# Patient Record
Sex: MALE | Race: BLACK OR AFRICAN AMERICAN | NOT HISPANIC OR LATINO | Employment: FULL TIME | URBAN - METROPOLITAN AREA
[De-identification: names, ages, dates, MRNs, and addresses within clinical notes are randomized per-mention and may not be internally consistent; named-entity substitution may affect disease eponyms.]

---

## 2018-08-27 ENCOUNTER — OFFICE VISIT (OUTPATIENT)
Dept: FAMILY MEDICINE | Facility: CLINIC | Age: 28
End: 2018-08-27
Payer: COMMERCIAL

## 2018-08-27 VITALS
WEIGHT: 171.88 LBS | HEIGHT: 65 IN | DIASTOLIC BLOOD PRESSURE: 82 MMHG | HEART RATE: 91 BPM | OXYGEN SATURATION: 98 % | BODY MASS INDEX: 28.64 KG/M2 | SYSTOLIC BLOOD PRESSURE: 130 MMHG

## 2018-08-27 DIAGNOSIS — Z11.4 ENCOUNTER FOR HIV (HUMAN IMMUNODEFICIENCY VIRUS) TEST: ICD-10-CM

## 2018-08-27 DIAGNOSIS — Z00.00 ANNUAL PHYSICAL EXAM: Primary | ICD-10-CM

## 2018-08-27 DIAGNOSIS — Z23 NEED FOR PNEUMOCOCCAL VACCINATION: ICD-10-CM

## 2018-08-27 DIAGNOSIS — Z23 NEED FOR TDAP VACCINATION: ICD-10-CM

## 2018-08-27 PROCEDURE — 90732 PPSV23 VACC 2 YRS+ SUBQ/IM: CPT | Mod: S$GLB,,, | Performed by: NURSE PRACTITIONER

## 2018-08-27 PROCEDURE — 90715 TDAP VACCINE 7 YRS/> IM: CPT | Mod: S$GLB,,, | Performed by: NURSE PRACTITIONER

## 2018-08-27 PROCEDURE — 99385 PREV VISIT NEW AGE 18-39: CPT | Mod: 25,S$GLB,, | Performed by: NURSE PRACTITIONER

## 2018-08-27 PROCEDURE — 90471 IMMUNIZATION ADMIN: CPT | Mod: S$GLB,,, | Performed by: NURSE PRACTITIONER

## 2018-08-27 PROCEDURE — 90472 IMMUNIZATION ADMIN EACH ADD: CPT | Mod: S$GLB,,, | Performed by: NURSE PRACTITIONER

## 2018-08-27 NOTE — PROGRESS NOTES
Subjective:       Patient ID: Cameron Gaona is a 27 y.o. male.    Chief Complaint: Annual Exam    Pt presents to the clinic to day for annual exam. Needs paper work filled out for his job. No complaints.  Would like to get tested got HIV.      Review of Systems   Constitutional: Negative.    HENT: Negative.    Eyes: Negative.    Respiratory: Negative.    Cardiovascular: Negative.    Gastrointestinal: Negative.    Genitourinary: Negative.    Musculoskeletal: Negative.    Neurological: Negative.    Psychiatric/Behavioral: Negative.        Objective:      Physical Exam   Constitutional: He is oriented to person, place, and time. He appears well-developed and well-nourished. No distress.   HENT:   Head: Normocephalic.   Right Ear: Tympanic membrane and external ear normal.   Left Ear: Tympanic membrane and external ear normal.   Nose: No mucosal edema or rhinorrhea. Right sinus exhibits no maxillary sinus tenderness and no frontal sinus tenderness. Left sinus exhibits no maxillary sinus tenderness and no frontal sinus tenderness.   Mouth/Throat: No oropharyngeal exudate, posterior oropharyngeal edema or posterior oropharyngeal erythema.   Eyes: Pupils are equal, round, and reactive to light.   Neck: Normal range of motion.   Cardiovascular: Normal rate, regular rhythm and normal heart sounds.   Pulmonary/Chest: Effort normal and breath sounds normal. No respiratory distress.   Abdominal: Soft. Bowel sounds are normal. He exhibits no distension. There is no tenderness.   Musculoskeletal: Normal range of motion. He exhibits no edema.   Neurological: He is alert and oriented to person, place, and time.   Skin: Skin is warm and dry. He is not diaphoretic.   Psychiatric: He has a normal mood and affect. His behavior is normal. Judgment and thought content normal.       Assessment:       1. Annual physical exam    2. Encounter for HIV (human immunodeficiency virus) test    3. Need for Tdap vaccination    4. Need for  pneumococcal vaccination        Plan:       Annual physical exam  -     CBC auto differential; Future  -     Comprehensive metabolic panel; Future  -     Lipid panel; Future  -     TSH; Future  -     URINALYSIS; Future    Encounter for HIV (human immunodeficiency virus) test  -     HIV-1 and HIV-2 antibodies; Future    Need for Tdap vaccination  -     (In Office Administered) Tdap Vaccine    Need for pneumococcal vaccination  -     (In Office Administered) Pneumococcal Polysaccharide Vaccine (23 Valent) (SQ/IM)

## 2018-08-28 ENCOUNTER — LAB VISIT (OUTPATIENT)
Dept: LAB | Facility: HOSPITAL | Age: 28
End: 2018-08-28
Attending: NURSE PRACTITIONER
Payer: COMMERCIAL

## 2018-08-28 ENCOUNTER — PATIENT MESSAGE (OUTPATIENT)
Dept: FAMILY MEDICINE | Facility: CLINIC | Age: 28
End: 2018-08-28

## 2018-08-28 ENCOUNTER — TELEPHONE (OUTPATIENT)
Dept: FAMILY MEDICINE | Facility: CLINIC | Age: 28
End: 2018-08-28

## 2018-08-28 DIAGNOSIS — Z11.4 ENCOUNTER FOR HIV (HUMAN IMMUNODEFICIENCY VIRUS) TEST: ICD-10-CM

## 2018-08-28 DIAGNOSIS — Z00.00 ANNUAL PHYSICAL EXAM: ICD-10-CM

## 2018-08-28 LAB
ALBUMIN SERPL BCP-MCNC: 4 G/DL
ALP SERPL-CCNC: 81 U/L
ALT SERPL W/O P-5'-P-CCNC: 62 U/L
ANION GAP SERPL CALC-SCNC: 4 MMOL/L
AST SERPL-CCNC: 43 U/L
BASOPHILS # BLD AUTO: 0.01 K/UL
BASOPHILS NFR BLD: 0.1 %
BILIRUB SERPL-MCNC: 0.5 MG/DL
BUN SERPL-MCNC: 13 MG/DL
CALCIUM SERPL-MCNC: 9 MG/DL
CHLORIDE SERPL-SCNC: 106 MMOL/L
CHOLEST SERPL-MCNC: 172 MG/DL
CHOLEST/HDLC SERPL: 3.2 {RATIO}
CO2 SERPL-SCNC: 28 MMOL/L
CREAT SERPL-MCNC: 1.04 MG/DL
DIFFERENTIAL METHOD: NORMAL
EOSINOPHIL # BLD AUTO: 0.1 K/UL
EOSINOPHIL NFR BLD: 1.7 %
ERYTHROCYTE [DISTWIDTH] IN BLOOD BY AUTOMATED COUNT: 14.5 %
EST. GFR  (AFRICAN AMERICAN): >60 ML/MIN/1.73 M^2
EST. GFR  (NON AFRICAN AMERICAN): >60 ML/MIN/1.73 M^2
GLUCOSE SERPL-MCNC: 85 MG/DL
HCT VFR BLD AUTO: 44.1 %
HDLC SERPL-MCNC: 54 MG/DL
HDLC SERPL: 31.4 %
HGB BLD-MCNC: 14.9 G/DL
LDLC SERPL CALC-MCNC: 106 MG/DL
LYMPHOCYTES # BLD AUTO: 1.9 K/UL
LYMPHOCYTES NFR BLD: 23.3 %
MCH RBC QN AUTO: 29 PG
MCHC RBC AUTO-ENTMCNC: 33.8 G/DL
MCV RBC AUTO: 86 FL
MONOCYTES # BLD AUTO: 0.9 K/UL
MONOCYTES NFR BLD: 11 %
NEUTROPHILS # BLD AUTO: 5.2 K/UL
NEUTROPHILS NFR BLD: 63.7 %
NONHDLC SERPL-MCNC: 118 MG/DL
PLATELET # BLD AUTO: 268 K/UL
PMV BLD AUTO: 10.1 FL
POTASSIUM SERPL-SCNC: 4.8 MMOL/L
PROT SERPL-MCNC: 7.1 G/DL
RBC # BLD AUTO: 5.13 M/UL
SODIUM SERPL-SCNC: 138 MMOL/L
TRIGL SERPL-MCNC: 60 MG/DL
TSH SERPL DL<=0.005 MIU/L-ACNC: 1.58 UIU/ML
WBC # BLD AUTO: 8.16 K/UL

## 2018-08-28 PROCEDURE — 36415 COLL VENOUS BLD VENIPUNCTURE: CPT | Mod: PO

## 2018-08-28 PROCEDURE — 80061 LIPID PANEL: CPT

## 2018-08-28 PROCEDURE — 86703 HIV-1/HIV-2 1 RESULT ANTBDY: CPT | Mod: PO

## 2018-08-28 PROCEDURE — 84443 ASSAY THYROID STIM HORMONE: CPT | Mod: PO

## 2018-08-28 PROCEDURE — 80053 COMPREHEN METABOLIC PANEL: CPT | Mod: PO

## 2018-08-28 PROCEDURE — 85025 COMPLETE CBC W/AUTO DIFF WBC: CPT | Mod: PO

## 2018-08-28 NOTE — TELEPHONE ENCOUNTER
Spoke with patient let him know blood work was normal will fax over his paper work to his employer

## 2018-08-29 ENCOUNTER — TELEPHONE (OUTPATIENT)
Dept: FAMILY MEDICINE | Facility: CLINIC | Age: 28
End: 2018-08-29

## 2018-08-29 LAB — HIV 1+2 AB+HIV1 P24 AG SERPL QL IA: NEGATIVE

## 2019-05-11 ENCOUNTER — OFFICE VISIT (OUTPATIENT)
Dept: FAMILY MEDICINE | Facility: CLINIC | Age: 29
End: 2019-05-11
Payer: COMMERCIAL

## 2019-05-11 VITALS
BODY MASS INDEX: 31.94 KG/M2 | WEIGHT: 191.69 LBS | HEIGHT: 65 IN | SYSTOLIC BLOOD PRESSURE: 120 MMHG | OXYGEN SATURATION: 100 % | HEART RATE: 93 BPM | TEMPERATURE: 99 F | DIASTOLIC BLOOD PRESSURE: 80 MMHG

## 2019-05-11 DIAGNOSIS — J06.9 ACUTE URI: ICD-10-CM

## 2019-05-11 DIAGNOSIS — Z20.2 EXPOSURE TO STD: Primary | ICD-10-CM

## 2019-05-11 PROCEDURE — 99213 OFFICE O/P EST LOW 20 MIN: CPT | Mod: S$GLB,,, | Performed by: FAMILY MEDICINE

## 2019-05-11 PROCEDURE — 99213 PR OFFICE/OUTPT VISIT, EST, LEVL III, 20-29 MIN: ICD-10-PCS | Mod: S$GLB,,, | Performed by: FAMILY MEDICINE

## 2019-05-11 RX ORDER — CEFTRIAXONE 1 G/1
1 INJECTION, POWDER, FOR SOLUTION INTRAMUSCULAR; INTRAVENOUS
Status: DISCONTINUED | OUTPATIENT
Start: 2019-05-11 | End: 2019-05-11

## 2019-05-11 RX ORDER — CODEINE PHOSPHATE AND GUAIFENESIN 10; 100 MG/5ML; MG/5ML
5 SOLUTION ORAL 3 TIMES DAILY PRN
Qty: 118 ML | Refills: 0 | Status: SHIPPED | OUTPATIENT
Start: 2019-05-11 | End: 2019-05-21

## 2019-05-11 RX ORDER — METRONIDAZOLE 500 MG/1
TABLET ORAL
Qty: 4 TABLET | Refills: 0 | Status: SHIPPED | OUTPATIENT
Start: 2019-05-11 | End: 2019-09-13

## 2019-05-11 NOTE — PROGRESS NOTES
Chief Complaint  Chief Complaint   Patient presents with    Check up; possible exposure to STD, sinus dacia; sore throat       HPI  Cameron Gaona is a 28 y.o. male with multiple medical diagnoses as listed in the medical history and problem list that presents for cold symptoms and STD exposure  1. Cold sympoms incude cough sore throat and nasal congestion.  Symtpoms have been present for 7 days and are starting to improve.    No fevers, SOB or wheezing.      2.  Patient was exposed to trichomonas.  He is not having any penile discharge or dysuria.  No fevers.    PAST MEDICAL HISTORY:  History reviewed. No pertinent past medical history.    PAST SURGICAL HISTORY:  History reviewed. No pertinent surgical history.    SOCIAL HISTORY:  Social History     Socioeconomic History    Marital status: Single     Spouse name: Not on file    Number of children: Not on file    Years of education: Not on file    Highest education level: Not on file   Occupational History    Not on file   Social Needs    Financial resource strain: Not on file    Food insecurity:     Worry: Not on file     Inability: Not on file    Transportation needs:     Medical: Not on file     Non-medical: Not on file   Tobacco Use    Smoking status: Current Every Day Smoker    Smokeless tobacco: Current User   Substance and Sexual Activity    Alcohol use: Yes    Drug use: No    Sexual activity: Not on file   Lifestyle    Physical activity:     Days per week: Not on file     Minutes per session: Not on file    Stress: Not on file   Relationships    Social connections:     Talks on phone: Not on file     Gets together: Not on file     Attends [employer]ism service: Not on file     Active member of club or organization: Not on file     Attends meetings of clubs or organizations: Not on file     Relationship status: Not on file   Other Topics Concern    Not on file   Social History Narrative    Not on file       FAMILY HISTORY:  History reviewed.  "No pertinent family history.    ALLERGIES AND MEDICATIONS: updated and reviewed.  Review of patient's allergies indicates:   Allergen Reactions    Shrimp Swelling     Throat swelling       Current Outpatient Medications   Medication Sig Dispense Refill    guaifenesin-codeine 100-10 mg/5 ml (CHERATUSSIN AC)  mg/5 mL syrup Take 5 mLs by mouth 3 (three) times daily as needed. 118 mL 0    metroNIDAZOLE (FLAGYL) 500 MG tablet Take 4 tablets once 4 tablet 0     No current facility-administered medications for this visit.          ROS  Review of Systems   Constitutional: Negative for activity change, appetite change, chills and fatigue.   HENT: Positive for rhinorrhea and sore throat. Negative for congestion, ear discharge, hearing loss, mouth sores, sinus pain and trouble swallowing.    Eyes: Negative for photophobia, pain, discharge and visual disturbance.   Respiratory: Positive for cough. Negative for chest tightness, shortness of breath and wheezing.    Cardiovascular: Negative for chest pain, palpitations and leg swelling.   Gastrointestinal: Negative for abdominal distention, abdominal pain, blood in stool, constipation, diarrhea, nausea and vomiting.   Genitourinary: Negative for difficulty urinating, dysuria and flank pain.   Musculoskeletal: Negative for arthralgias, back pain, gait problem, joint swelling and myalgias.   Skin: Negative for color change and rash.   Neurological: Negative for dizziness, tremors, speech difficulty, light-headedness, numbness and headaches.   Psychiatric/Behavioral: Negative for behavioral problems, confusion, hallucinations, sleep disturbance and suicidal ideas.           PHYSICAL EXAM  Vitals:    05/11/19 1018   BP: 120/80   Pulse: 93   Temp: 98.8 °F (37.1 °C)   SpO2: 100%   Weight: 87 kg (191 lb 11 oz)   Height: 5' 5" (1.651 m)    Body mass index is 31.9 kg/m².  Weight: 87 kg (191 lb 11 oz)   Height: 5' 5" (165.1 cm)     Physical Exam   Constitutional: He appears " well-developed.   HENT:   Head: Normocephalic.   Right Ear: Tympanic membrane, external ear and ear canal normal.   Left Ear: Tympanic membrane, external ear and ear canal normal.   Mouth/Throat: Posterior oropharyngeal erythema present.   Eyes: Conjunctivae are normal.   Neck: Normal range of motion. Neck supple.   Cardiovascular: Normal rate and regular rhythm.   Pulmonary/Chest: Effort normal and breath sounds normal.   Neurological: He is alert.   Skin: Skin is warm.   Psychiatric: He has a normal mood and affect.   Nursing note and vitals reviewed.        Health Maintenance       Date Due Completion Date    Influenza Vaccine 08/01/2019 ---    TETANUS VACCINE 08/27/2028 8/27/2018            Assessment & Plan      Cameron was seen today for check up; possible exposure to std, sinus dacia; sore throat.    Diagnoses and all orders for this visit:    Exposure to STD  -     C. trachomatis/N. gonorrhoeae by AMP DNA [facility]; Urine  -     Trichomonas vaginalis, RNA, Qual, Urine  -     HIV 1/2 Ag/Ab (4th Gen); Future  -     metroNIDAZOLE (FLAGYL) 500 MG tablet; Take 4 tablets once    Acute URI  -     guaifenesin-codeine 100-10 mg/5 ml (CHERATUSSIN AC)  mg/5 mL syrup; Take 5 mLs by mouth 3 (three) times daily as needed.          Follow-up: No follow-ups on file.

## 2019-09-13 ENCOUNTER — OFFICE VISIT (OUTPATIENT)
Dept: FAMILY MEDICINE | Facility: CLINIC | Age: 29
End: 2019-09-13
Payer: COMMERCIAL

## 2019-09-13 VITALS
OXYGEN SATURATION: 97 % | TEMPERATURE: 98 F | DIASTOLIC BLOOD PRESSURE: 78 MMHG | HEART RATE: 81 BPM | BODY MASS INDEX: 33.31 KG/M2 | WEIGHT: 199.94 LBS | HEIGHT: 65 IN | SYSTOLIC BLOOD PRESSURE: 110 MMHG

## 2019-09-13 DIAGNOSIS — Z23 NEED FOR INFLUENZA VACCINATION: ICD-10-CM

## 2019-09-13 DIAGNOSIS — Z72.51 HIGH RISK SEXUAL BEHAVIOR, UNSPECIFIED TYPE: Primary | ICD-10-CM

## 2019-09-13 PROCEDURE — 90686 IIV4 VACC NO PRSV 0.5 ML IM: CPT | Mod: S$GLB,,, | Performed by: FAMILY MEDICINE

## 2019-09-13 PROCEDURE — 99213 OFFICE O/P EST LOW 20 MIN: CPT | Mod: 25,S$GLB,, | Performed by: FAMILY MEDICINE

## 2019-09-13 PROCEDURE — 99213 PR OFFICE/OUTPT VISIT, EST, LEVL III, 20-29 MIN: ICD-10-PCS | Mod: 25,S$GLB,, | Performed by: FAMILY MEDICINE

## 2019-09-13 PROCEDURE — 90471 FLU VACCINE (QUAD) GREATER THAN OR EQUAL TO 3YO PRESERVATIVE FREE IM: ICD-10-PCS | Mod: S$GLB,,, | Performed by: FAMILY MEDICINE

## 2019-09-13 PROCEDURE — 90686 FLU VACCINE (QUAD) GREATER THAN OR EQUAL TO 3YO PRESERVATIVE FREE IM: ICD-10-PCS | Mod: S$GLB,,, | Performed by: FAMILY MEDICINE

## 2019-09-13 PROCEDURE — 90471 IMMUNIZATION ADMIN: CPT | Mod: S$GLB,,, | Performed by: FAMILY MEDICINE

## 2019-09-13 NOTE — PROGRESS NOTES
Chief Complaint  Chief Complaint   Patient presents with    Follow-up     STD testing        HPI  Cameron Gaona is a 28 y.o. male with multiple medical diagnoses as listed in the medical history and problem list that presents for concerns about a strong odor from his pubic area.  He denies any lesions.  No exposure to an STD.  He is strictly heterosexual.  No pelile lesions or drainage.           PAST MEDICAL HISTORY:  History reviewed. No pertinent past medical history.    PAST SURGICAL HISTORY:  History reviewed. No pertinent surgical history.    SOCIAL HISTORY:  Social History     Socioeconomic History    Marital status: Single     Spouse name: Not on file    Number of children: Not on file    Years of education: Not on file    Highest education level: Not on file   Occupational History    Not on file   Social Needs    Financial resource strain: Not on file    Food insecurity:     Worry: Not on file     Inability: Not on file    Transportation needs:     Medical: Not on file     Non-medical: Not on file   Tobacco Use    Smoking status: Current Every Day Smoker    Smokeless tobacco: Current User   Substance and Sexual Activity    Alcohol use: Yes    Drug use: No    Sexual activity: Not on file   Lifestyle    Physical activity:     Days per week: Not on file     Minutes per session: Not on file    Stress: Not at all   Relationships    Social connections:     Talks on phone: Not on file     Gets together: Not on file     Attends Gnosticist service: Not on file     Active member of club or organization: Not on file     Attends meetings of clubs or organizations: Not on file     Relationship status: Not on file   Other Topics Concern    Not on file   Social History Narrative    Not on file       FAMILY HISTORY:  History reviewed. No pertinent family history.    ALLERGIES AND MEDICATIONS: updated and reviewed.  Review of patient's allergies indicates:   Allergen Reactions    Shrimp Swelling      "Throat swelling       No current outpatient medications on file.     No current facility-administered medications for this visit.          ROS  Review of Systems   Constitutional: Negative for activity change, appetite change, chills and fatigue.   HENT: Negative for congestion, ear discharge, hearing loss, mouth sores, rhinorrhea, sinus pain, sore throat and trouble swallowing.    Eyes: Negative for photophobia, pain, discharge and visual disturbance.   Respiratory: Negative for cough, chest tightness, shortness of breath and wheezing.    Cardiovascular: Negative for chest pain, palpitations and leg swelling.   Gastrointestinal: Negative for abdominal distention, abdominal pain, blood in stool, constipation, diarrhea, nausea and vomiting.   Genitourinary: Negative for difficulty urinating, dysuria and flank pain.   Musculoskeletal: Negative for arthralgias, back pain, gait problem, joint swelling and myalgias.   Skin: Negative for color change and rash.   Neurological: Negative for dizziness, tremors, speech difficulty, light-headedness, numbness and headaches.   Psychiatric/Behavioral: Negative for behavioral problems, confusion, hallucinations, sleep disturbance and suicidal ideas.           PHYSICAL EXAM  Vitals:    09/13/19 1009   BP: 110/78   BP Location: Left arm   Patient Position: Sitting   Pulse: 81   Temp: 98.4 °F (36.9 °C)   TempSrc: Oral   SpO2: 97%   Weight: 90.7 kg (199 lb 15.3 oz)   Height: 5' 5" (1.651 m)    Body mass index is 33.27 kg/m².  Weight: 90.7 kg (199 lb 15.3 oz)   Height: 5' 5" (165.1 cm)     Physical Exam   Constitutional: He appears well-developed.   HENT:   Head: Normocephalic.   Right Ear: Tympanic membrane, external ear and ear canal normal.   Left Ear: Tympanic membrane, external ear and ear canal normal.   Mouth/Throat: Posterior oropharyngeal erythema present.   Eyes: Conjunctivae are normal.   Neck: Normal range of motion. Neck supple.   Cardiovascular: Normal rate and regular " rhythm.   Pulmonary/Chest: Effort normal and breath sounds normal.   Neurological: He is alert.   Skin: Skin is warm.   Psychiatric: He has a normal mood and affect.   Nursing note and vitals reviewed.        Health Maintenance       Date Due Completion Date    Influenza Vaccine 08/01/2019 ---    TETANUS VACCINE 08/27/2028 8/27/2018            Assessment & Plan      Cameron was seen today for check up; possible exposure to std, sinus dacia; sore throat.    Diagnoses and all orders for this visit:    High risk sexual behavior, unspecified type  -     C. trachomatis/N. gonorrhoeae by AMP DNA [facility]; Urine  -     RPR; Future; Expected date: 09/13/2019  -     HIV 1/2 Ag/Ab (4th Gen); Future; Expected date: 09/13/2019    Need for influenza vaccination  -     Influenza - Quadrivalent (6 months+) (PF)              Follow-up: No follow-ups on file.

## 2020-03-10 ENCOUNTER — OFFICE VISIT (OUTPATIENT)
Dept: FAMILY MEDICINE | Facility: CLINIC | Age: 30
End: 2020-03-10
Payer: COMMERCIAL

## 2020-03-10 ENCOUNTER — LAB VISIT (OUTPATIENT)
Dept: LAB | Facility: HOSPITAL | Age: 30
End: 2020-03-10
Attending: FAMILY MEDICINE
Payer: COMMERCIAL

## 2020-03-10 VITALS
OXYGEN SATURATION: 97 % | SYSTOLIC BLOOD PRESSURE: 110 MMHG | TEMPERATURE: 99 F | BODY MASS INDEX: 33.07 KG/M2 | DIASTOLIC BLOOD PRESSURE: 70 MMHG | WEIGHT: 198.5 LBS | HEIGHT: 65 IN | HEART RATE: 99 BPM

## 2020-03-10 DIAGNOSIS — Z00.00 WELLNESS EXAMINATION: ICD-10-CM

## 2020-03-10 DIAGNOSIS — Z13.220 LIPID SCREENING: ICD-10-CM

## 2020-03-10 DIAGNOSIS — Z72.51 HIGH RISK SEXUAL BEHAVIOR, UNSPECIFIED TYPE: ICD-10-CM

## 2020-03-10 DIAGNOSIS — Z11.4 ENCOUNTER FOR SCREENING FOR HIV: Primary | ICD-10-CM

## 2020-03-10 LAB
CHOLEST SERPL-MCNC: 173 MG/DL (ref 120–199)
CHOLEST/HDLC SERPL: 4.1 {RATIO} (ref 2–5)
GLUCOSE SERPL-MCNC: 90 MG/DL (ref 70–110)
HDLC SERPL-MCNC: 42 MG/DL (ref 40–75)
HDLC SERPL: 24.3 % (ref 20–50)
LDLC SERPL CALC-MCNC: 117.8 MG/DL (ref 63–159)
NONHDLC SERPL-MCNC: 131 MG/DL
TRIGL SERPL-MCNC: 66 MG/DL (ref 30–150)

## 2020-03-10 PROCEDURE — 86703 HIV-1/HIV-2 1 RESULT ANTBDY: CPT | Mod: PO

## 2020-03-10 PROCEDURE — 86592 SYPHILIS TEST NON-TREP QUAL: CPT | Mod: PO

## 2020-03-10 PROCEDURE — 99395 PR PREVENTIVE VISIT,EST,18-39: ICD-10-PCS | Mod: S$GLB,,, | Performed by: FAMILY MEDICINE

## 2020-03-10 PROCEDURE — 80061 LIPID PANEL: CPT

## 2020-03-10 PROCEDURE — 82947 ASSAY GLUCOSE BLOOD QUANT: CPT | Mod: PO

## 2020-03-10 PROCEDURE — 99395 PREV VISIT EST AGE 18-39: CPT | Mod: S$GLB,,, | Performed by: FAMILY MEDICINE

## 2020-03-10 PROCEDURE — 36415 COLL VENOUS BLD VENIPUNCTURE: CPT | Mod: PO

## 2020-03-10 NOTE — PROGRESS NOTES
Chief Complaint  Chief Complaint   Patient presents with    Annual Exam       HPI  Cameron Gaona is a 29 y.o. male with multiple medical diagnoses as listed in the medical history and problem list that presents for a wellness exam.  He has no concerns or problems today.       PAST MEDICAL HISTORY:  History reviewed. No pertinent past medical history.    PAST SURGICAL HISTORY:  History reviewed. No pertinent surgical history.    SOCIAL HISTORY:  Social History     Socioeconomic History    Marital status: Single     Spouse name: Not on file    Number of children: Not on file    Years of education: Not on file    Highest education level: Not on file   Occupational History    Not on file   Social Needs    Financial resource strain: Not on file    Food insecurity:     Worry: Not on file     Inability: Not on file    Transportation needs:     Medical: Not on file     Non-medical: Not on file   Tobacco Use    Smoking status: Former Smoker    Smokeless tobacco: Former User   Substance and Sexual Activity    Alcohol use: Yes    Drug use: No    Sexual activity: Not on file   Lifestyle    Physical activity:     Days per week: Not on file     Minutes per session: Not on file    Stress: Not at all   Relationships    Social connections:     Talks on phone: Not on file     Gets together: Not on file     Attends Gnosticist service: Not on file     Active member of club or organization: Not on file     Attends meetings of clubs or organizations: Not on file     Relationship status: Not on file   Other Topics Concern    Not on file   Social History Narrative    Not on file       FAMILY HISTORY:  History reviewed. No pertinent family history.    ALLERGIES AND MEDICATIONS: updated and reviewed.  Review of patient's allergies indicates:   Allergen Reactions    Shrimp Swelling     Throat swelling       No current outpatient medications on file.     No current facility-administered medications for this visit.   "        ROS  Review of Systems   Constitutional: Negative for activity change, appetite change, chills and fatigue.   HENT: Negative for congestion, ear discharge, hearing loss, mouth sores, rhinorrhea, sinus pain and trouble swallowing.    Eyes: Negative for photophobia, pain, discharge and visual disturbance.   Respiratory: Negative for cough, chest tightness, shortness of breath and wheezing.    Cardiovascular: Negative for chest pain, palpitations and leg swelling.   Gastrointestinal: Negative for abdominal distention, abdominal pain, blood in stool, constipation, diarrhea, nausea and vomiting.   Genitourinary: Negative for difficulty urinating, dysuria and flank pain.   Musculoskeletal: Negative for arthralgias, back pain, gait problem, joint swelling and myalgias.   Skin: Negative for color change and rash.   Neurological: Negative for dizziness, tremors, speech difficulty, light-headedness, numbness and headaches.   Psychiatric/Behavioral: Negative for behavioral problems, confusion, hallucinations, sleep disturbance and suicidal ideas.           PHYSICAL EXAM  Vitals:    03/10/20 1141   BP: 110/70   BP Location: Right arm   Patient Position: Sitting   Pulse: 99   Temp: 99 °F (37.2 °C)   TempSrc: Oral   SpO2: 97%   Weight: 90.1 kg (198 lb 8.4 oz)   Height: 5' 5" (1.651 m)    Body mass index is 33.04 kg/m².  Weight: 90.1 kg (198 lb 8.4 oz)   Height: 5' 5" (165.1 cm)     Physical Exam   Constitutional: He appears well-developed.   HENT:   Head: Normocephalic.   Eyes: Conjunctivae are normal.   Neck: Normal range of motion. Neck supple.   Cardiovascular: Normal rate and regular rhythm.   Pulmonary/Chest: Effort normal and breath sounds normal.   Neurological: He is alert.   Skin: Skin is warm.   Psychiatric: He has a normal mood and affect.   Nursing note and vitals reviewed.        Health Maintenance       Date Due Completion Date    TETANUS VACCINE 08/27/2028 8/27/2018            Assessment & Plan      Cameron " was seen today for annual exam.    Diagnoses and all orders for this visit:    Encounter for screening for HIV    Lipid screening  -     Lipid panel; Future    Wellness examination  -     Glucose, fasting; Future          Follow-up: No follow-ups on file.

## 2020-03-11 ENCOUNTER — PATIENT MESSAGE (OUTPATIENT)
Dept: FAMILY MEDICINE | Facility: CLINIC | Age: 30
End: 2020-03-11

## 2020-03-11 LAB
HIV 1+2 AB+HIV1 P24 AG SERPL QL IA: NEGATIVE
RPR SER QL: NORMAL

## 2020-03-12 ENCOUNTER — TELEPHONE (OUTPATIENT)
Dept: FAMILY MEDICINE | Facility: CLINIC | Age: 30
End: 2020-03-12

## 2020-03-12 NOTE — TELEPHONE ENCOUNTER
----- Message from Andree Chao sent at 3/12/2020 10:07 AM CDT -----  Contact: Pt [phone]  Pt calling in reference to some paperwork he gave the doctor    Pt need to know if the paper work have been completed by the doctor    Pt states it can be fax to [phone]    Please advise pt at [phone]

## 2020-12-03 ENCOUNTER — OFFICE VISIT (OUTPATIENT)
Dept: FAMILY MEDICINE | Facility: CLINIC | Age: 30
End: 2020-12-03
Payer: COMMERCIAL

## 2020-12-03 VITALS
TEMPERATURE: 98 F | HEART RATE: 68 BPM | BODY MASS INDEX: 35.26 KG/M2 | OXYGEN SATURATION: 97 % | WEIGHT: 211.63 LBS | HEIGHT: 65 IN | DIASTOLIC BLOOD PRESSURE: 80 MMHG | SYSTOLIC BLOOD PRESSURE: 116 MMHG

## 2020-12-03 DIAGNOSIS — Z23 NEED FOR INFLUENZA VACCINATION: Primary | ICD-10-CM

## 2020-12-03 DIAGNOSIS — M54.9 BACK PAIN, UNSPECIFIED BACK LOCATION, UNSPECIFIED BACK PAIN LATERALITY, UNSPECIFIED CHRONICITY: ICD-10-CM

## 2020-12-03 PROCEDURE — 90471 IMMUNIZATION ADMIN: CPT | Mod: S$GLB,,, | Performed by: FAMILY MEDICINE

## 2020-12-03 PROCEDURE — 90686 IIV4 VACC NO PRSV 0.5 ML IM: CPT | Mod: S$GLB,,, | Performed by: FAMILY MEDICINE

## 2020-12-03 PROCEDURE — 90471 FLU VACCINE (QUAD) GREATER THAN OR EQUAL TO 3YO PRESERVATIVE FREE IM: ICD-10-PCS | Mod: S$GLB,,, | Performed by: FAMILY MEDICINE

## 2020-12-03 PROCEDURE — 99214 OFFICE O/P EST MOD 30 MIN: CPT | Mod: 25,S$GLB,, | Performed by: FAMILY MEDICINE

## 2020-12-03 PROCEDURE — 99214 PR OFFICE/OUTPT VISIT, EST, LEVL IV, 30-39 MIN: ICD-10-PCS | Mod: 25,S$GLB,, | Performed by: FAMILY MEDICINE

## 2020-12-03 PROCEDURE — 90686 FLU VACCINE (QUAD) GREATER THAN OR EQUAL TO 3YO PRESERVATIVE FREE IM: ICD-10-PCS | Mod: S$GLB,,, | Performed by: FAMILY MEDICINE

## 2020-12-03 RX ORDER — METHOCARBAMOL 500 MG/1
500 TABLET, FILM COATED ORAL NIGHTLY
Qty: 10 TABLET | Refills: 0 | Status: SHIPPED | OUTPATIENT
Start: 2020-12-03 | End: 2020-12-13

## 2020-12-03 RX ORDER — DICLOFENAC SODIUM 75 MG/1
75 TABLET, DELAYED RELEASE ORAL 2 TIMES DAILY
Qty: 20 TABLET | Refills: 0 | Status: SHIPPED | OUTPATIENT
Start: 2020-12-03 | End: 2021-12-03

## 2020-12-03 NOTE — PATIENT INSTRUCTIONS
Back Exercises: Leg Pull    To start, lie on your back with your knees bent and feet flat on the floor. Dont press your neck or lower back to the floor. Breathe deeply. You should feel comfortable and relaxed in this position.  · Pull one knee to your chest.  · Hold for 30 to 60 seconds. Return to starting position.  · Repeat 2 times.  · Switch legs.  · For a double leg pull, pull both legs to your chest at the same time. Repeat 2 times.  For your safety, check with your healthcare provider before starting an exercise program.   Date Last Reviewed: 8/16/2015  © 4692-6883 [employer]. [address] [place] [address]. All rights reserved. This information is not intended as a substitute for professional medical care. Always follow your healthcare professional's instructions.        Back Exercises: Back Press    Do this exercise on your hands and knees. Keep your knees under your hips and your hands under your shoulders. Keep your spine in a neutral position (not arched or sagging). Be sure to maintain your necks natural curve:  · Tighten your stomach and buttock muscles to press your back upward. Let your head drop slightly.  · Hold for 5 seconds. Return to starting position.  · Repeat 5 times.  Date Last Reviewed: 10/11/2015  © 6567-4643 [employer]. [address] [place] [address]. All rights reserved. This information is not intended as a substitute for professional medical care. Always follow your healthcare professional's instructions.
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
## 2021-05-14 ENCOUNTER — IMMUNIZATION (OUTPATIENT)
Dept: INTERNAL MEDICINE | Facility: CLINIC | Age: 31
End: 2021-05-14
Payer: COMMERCIAL

## 2021-05-14 DIAGNOSIS — Z23 NEED FOR VACCINATION: Primary | ICD-10-CM

## 2021-05-14 PROCEDURE — 0011A COVID-19, MRNA, LNP-S, PF, 100 MCG/0.5 ML DOSE VACCINE: CPT | Mod: PBBFAC | Performed by: INTERNAL MEDICINE

## 2021-07-22 ENCOUNTER — OFFICE VISIT (OUTPATIENT)
Dept: FAMILY MEDICINE | Facility: CLINIC | Age: 31
End: 2021-07-22
Payer: COMMERCIAL

## 2021-07-22 VITALS
SYSTOLIC BLOOD PRESSURE: 126 MMHG | BODY MASS INDEX: 34.6 KG/M2 | HEART RATE: 70 BPM | HEIGHT: 65 IN | DIASTOLIC BLOOD PRESSURE: 78 MMHG | WEIGHT: 207.69 LBS | OXYGEN SATURATION: 97 % | TEMPERATURE: 98 F

## 2021-07-22 DIAGNOSIS — Z11.59 ENCOUNTER FOR HEPATITIS C SCREENING TEST FOR LOW RISK PATIENT: ICD-10-CM

## 2021-07-22 DIAGNOSIS — M72.2 PLANTAR FASCIITIS: Primary | ICD-10-CM

## 2021-07-22 DIAGNOSIS — Z13.220 LIPID SCREENING: ICD-10-CM

## 2021-07-22 PROCEDURE — 3008F PR BODY MASS INDEX (BMI) DOCUMENTED: ICD-10-PCS | Mod: CPTII,S$GLB,, | Performed by: FAMILY MEDICINE

## 2021-07-22 PROCEDURE — 99214 OFFICE O/P EST MOD 30 MIN: CPT | Mod: S$GLB,,, | Performed by: FAMILY MEDICINE

## 2021-07-22 PROCEDURE — 1126F AMNT PAIN NOTED NONE PRSNT: CPT | Mod: CPTII,S$GLB,, | Performed by: FAMILY MEDICINE

## 2021-07-22 PROCEDURE — 99214 PR OFFICE/OUTPT VISIT, EST, LEVL IV, 30-39 MIN: ICD-10-PCS | Mod: S$GLB,,, | Performed by: FAMILY MEDICINE

## 2021-07-22 PROCEDURE — 1126F PR PAIN SEVERITY QUANTIFIED, NO PAIN PRESENT: ICD-10-PCS | Mod: CPTII,S$GLB,, | Performed by: FAMILY MEDICINE

## 2021-07-22 PROCEDURE — 3008F BODY MASS INDEX DOCD: CPT | Mod: CPTII,S$GLB,, | Performed by: FAMILY MEDICINE

## 2021-07-22 RX ORDER — IBUPROFEN 800 MG/1
800 TABLET ORAL 3 TIMES DAILY
Qty: 21 TABLET | Refills: 0 | Status: SHIPPED | OUTPATIENT
Start: 2021-07-22 | End: 2021-12-03

## 2021-08-27 ENCOUNTER — LAB VISIT (OUTPATIENT)
Dept: LAB | Facility: HOSPITAL | Age: 31
End: 2021-08-27
Attending: FAMILY MEDICINE
Payer: COMMERCIAL

## 2021-08-27 DIAGNOSIS — Z13.220 LIPID SCREENING: ICD-10-CM

## 2021-08-27 DIAGNOSIS — M72.2 PLANTAR FASCIITIS: ICD-10-CM

## 2021-08-27 DIAGNOSIS — Z11.59 ENCOUNTER FOR HEPATITIS C SCREENING TEST FOR LOW RISK PATIENT: ICD-10-CM

## 2021-08-27 LAB
ALBUMIN SERPL BCP-MCNC: 4.3 G/DL (ref 3.5–5.2)
ALP SERPL-CCNC: 92 U/L (ref 38–126)
ALT SERPL W/O P-5'-P-CCNC: 34 U/L (ref 10–44)
ANION GAP SERPL CALC-SCNC: 6 MMOL/L (ref 8–16)
AST SERPL-CCNC: 33 U/L (ref 15–46)
BASOPHILS # BLD AUTO: 0.01 K/UL (ref 0–0.2)
BASOPHILS NFR BLD: 0.2 % (ref 0–1.9)
BILIRUB SERPL-MCNC: 0.7 MG/DL (ref 0.1–1)
CALCIUM SERPL-MCNC: 9 MG/DL (ref 8.7–10.5)
CHLORIDE SERPL-SCNC: 106 MMOL/L (ref 95–110)
CHOLEST SERPL-MCNC: 166 MG/DL (ref 120–199)
CHOLEST/HDLC SERPL: 4.7 {RATIO} (ref 2–5)
CO2 SERPL-SCNC: 31 MMOL/L (ref 23–29)
CREAT SERPL-MCNC: 1.12 MG/DL (ref 0.5–1.4)
DIFFERENTIAL METHOD: NORMAL
EOSINOPHIL # BLD AUTO: 0.1 K/UL (ref 0–0.5)
EOSINOPHIL NFR BLD: 2.5 % (ref 0–8)
ERYTHROCYTE [DISTWIDTH] IN BLOOD BY AUTOMATED COUNT: 13.1 % (ref 11.5–14.5)
EST. GFR  (AFRICAN AMERICAN): >60 ML/MIN/1.73 M^2
EST. GFR  (NON AFRICAN AMERICAN): >60 ML/MIN/1.73 M^2
GLUCOSE SERPL-MCNC: 98 MG/DL (ref 70–110)
HCT VFR BLD AUTO: 44.4 % (ref 40–54)
HCV AB SERPL QL IA: NEGATIVE
HDLC SERPL-MCNC: 35 MG/DL (ref 40–75)
HDLC SERPL: 21.1 % (ref 20–50)
HGB BLD-MCNC: 14.2 G/DL (ref 14–18)
IMM GRANULOCYTES # BLD AUTO: 0.01 K/UL (ref 0–0.04)
IMM GRANULOCYTES NFR BLD AUTO: 0.2 % (ref 0–0.5)
LDLC SERPL CALC-MCNC: 120 MG/DL (ref 63–159)
LYMPHOCYTES # BLD AUTO: 2.2 K/UL (ref 1–4.8)
LYMPHOCYTES NFR BLD: 42.3 % (ref 18–48)
MCH RBC QN AUTO: 27.8 PG (ref 27–31)
MCHC RBC AUTO-ENTMCNC: 32 G/DL (ref 32–36)
MCV RBC AUTO: 87 FL (ref 82–98)
MONOCYTES # BLD AUTO: 0.4 K/UL (ref 0.3–1)
MONOCYTES NFR BLD: 7.9 % (ref 4–15)
NEUTROPHILS # BLD AUTO: 2.5 K/UL (ref 1.8–7.7)
NEUTROPHILS NFR BLD: 46.9 % (ref 38–73)
NONHDLC SERPL-MCNC: 131 MG/DL
NRBC BLD-RTO: 0 /100 WBC
PLATELET # BLD AUTO: 310 K/UL (ref 150–450)
PMV BLD AUTO: 9.5 FL (ref 9.2–12.9)
POTASSIUM SERPL-SCNC: 4.6 MMOL/L (ref 3.5–5.1)
PROT SERPL-MCNC: 7.4 G/DL (ref 6–8.4)
RBC # BLD AUTO: 5.1 M/UL (ref 4.6–6.2)
SODIUM SERPL-SCNC: 143 MMOL/L (ref 136–145)
TRIGL SERPL-MCNC: 55 MG/DL (ref 30–150)
UUN UR-MCNC: 12 MG/DL (ref 2–20)
WBC # BLD AUTO: 5.3 K/UL (ref 3.9–12.7)

## 2021-08-27 PROCEDURE — 80053 COMPREHEN METABOLIC PANEL: CPT | Mod: PO | Performed by: FAMILY MEDICINE

## 2021-08-27 PROCEDURE — 86803 HEPATITIS C AB TEST: CPT | Mod: PO | Performed by: FAMILY MEDICINE

## 2021-08-27 PROCEDURE — 80061 LIPID PANEL: CPT | Performed by: FAMILY MEDICINE

## 2021-08-27 PROCEDURE — 36415 COLL VENOUS BLD VENIPUNCTURE: CPT | Mod: PO | Performed by: FAMILY MEDICINE

## 2021-08-27 PROCEDURE — 85025 COMPLETE CBC W/AUTO DIFF WBC: CPT | Mod: PO | Performed by: FAMILY MEDICINE

## 2021-12-03 ENCOUNTER — OFFICE VISIT (OUTPATIENT)
Dept: FAMILY MEDICINE | Facility: CLINIC | Age: 31
End: 2021-12-03
Payer: COMMERCIAL

## 2021-12-03 VITALS
WEIGHT: 213.19 LBS | DIASTOLIC BLOOD PRESSURE: 68 MMHG | TEMPERATURE: 98 F | BODY MASS INDEX: 35.52 KG/M2 | OXYGEN SATURATION: 97 % | HEART RATE: 73 BPM | SYSTOLIC BLOOD PRESSURE: 126 MMHG | HEIGHT: 65 IN

## 2021-12-03 DIAGNOSIS — M72.2 PLANTAR FASCIITIS: Primary | ICD-10-CM

## 2021-12-03 PROCEDURE — 99214 OFFICE O/P EST MOD 30 MIN: CPT | Mod: S$GLB,,, | Performed by: FAMILY MEDICINE

## 2021-12-03 PROCEDURE — 99214 PR OFFICE/OUTPT VISIT, EST, LEVL IV, 30-39 MIN: ICD-10-PCS | Mod: S$GLB,,, | Performed by: FAMILY MEDICINE

## 2022-01-03 ENCOUNTER — HOSPITAL ENCOUNTER (OUTPATIENT)
Dept: RADIOLOGY | Facility: HOSPITAL | Age: 32
Discharge: HOME | End: 2022-01-03
Attending: PODIATRIST
Payer: COMMERCIAL

## 2022-01-03 ENCOUNTER — OFFICE VISIT (OUTPATIENT)
Dept: PODIATRY | Facility: CLINIC | Age: 32
End: 2022-01-03
Payer: COMMERCIAL

## 2022-01-03 ENCOUNTER — PATIENT MESSAGE (OUTPATIENT)
Dept: PODIATRY | Facility: CLINIC | Age: 32
End: 2022-01-03

## 2022-01-03 VITALS
SYSTOLIC BLOOD PRESSURE: 163 MMHG | BODY MASS INDEX: 35.49 KG/M2 | WEIGHT: 213 LBS | HEART RATE: 85 BPM | DIASTOLIC BLOOD PRESSURE: 82 MMHG | HEIGHT: 65 IN

## 2022-01-03 DIAGNOSIS — M72.2 PLANTAR FASCIITIS: ICD-10-CM

## 2022-01-03 DIAGNOSIS — G89.29 CHRONIC PAIN OF RIGHT HEEL: Primary | ICD-10-CM

## 2022-01-03 DIAGNOSIS — M79.671 CHRONIC PAIN OF RIGHT HEEL: Primary | ICD-10-CM

## 2022-01-03 PROCEDURE — 99999 PR PBB SHADOW E&M-EST. PATIENT-LVL IV: CPT | Mod: PBBFAC,,, | Performed by: PODIATRIST

## 2022-01-03 PROCEDURE — 99203 PR OFFICE/OUTPT VISIT, NEW, LEVL III, 30-44 MIN: ICD-10-PCS | Mod: S$GLB,,, | Performed by: PODIATRIST

## 2022-01-03 PROCEDURE — 3077F PR MOST RECENT SYSTOLIC BLOOD PRESSURE >= 140 MM HG: ICD-10-PCS | Mod: CPTII,S$GLB,, | Performed by: PODIATRIST

## 2022-01-03 PROCEDURE — 3008F BODY MASS INDEX DOCD: CPT | Mod: CPTII,S$GLB,, | Performed by: PODIATRIST

## 2022-01-03 PROCEDURE — 1159F MED LIST DOCD IN RCRD: CPT | Mod: CPTII,S$GLB,, | Performed by: PODIATRIST

## 2022-01-03 PROCEDURE — 1159F PR MEDICATION LIST DOCUMENTED IN MEDICAL RECORD: ICD-10-PCS | Mod: CPTII,S$GLB,, | Performed by: PODIATRIST

## 2022-01-03 PROCEDURE — 3077F SYST BP >= 140 MM HG: CPT | Mod: CPTII,S$GLB,, | Performed by: PODIATRIST

## 2022-01-03 PROCEDURE — 99999 PR PBB SHADOW E&M-EST. PATIENT-LVL IV: ICD-10-PCS | Mod: PBBFAC,,, | Performed by: PODIATRIST

## 2022-01-03 PROCEDURE — 1160F RVW MEDS BY RX/DR IN RCRD: CPT | Mod: CPTII,S$GLB,, | Performed by: PODIATRIST

## 2022-01-03 PROCEDURE — 99203 OFFICE O/P NEW LOW 30 MIN: CPT | Mod: S$GLB,,, | Performed by: PODIATRIST

## 2022-01-03 PROCEDURE — 3008F PR BODY MASS INDEX (BMI) DOCUMENTED: ICD-10-PCS | Mod: CPTII,S$GLB,, | Performed by: PODIATRIST

## 2022-01-03 PROCEDURE — 3079F DIAST BP 80-89 MM HG: CPT | Mod: CPTII,S$GLB,, | Performed by: PODIATRIST

## 2022-01-03 PROCEDURE — 1160F PR REVIEW ALL MEDS BY PRESCRIBER/CLIN PHARMACIST DOCUMENTED: ICD-10-PCS | Mod: CPTII,S$GLB,, | Performed by: PODIATRIST

## 2022-01-03 PROCEDURE — 73650 X-RAY EXAM OF HEEL: CPT | Mod: 26,,, | Performed by: RADIOLOGY

## 2022-01-03 PROCEDURE — 73650 XR CALCANEUS BILATERAL: ICD-10-PCS | Mod: 26,,, | Performed by: RADIOLOGY

## 2022-01-03 PROCEDURE — 3079F PR MOST RECENT DIASTOLIC BLOOD PRESSURE 80-89 MM HG: ICD-10-PCS | Mod: CPTII,S$GLB,, | Performed by: PODIATRIST

## 2022-01-03 PROCEDURE — 73650 X-RAY EXAM OF HEEL: CPT | Mod: TC,50,FY

## 2022-01-03 RX ORDER — MELOXICAM 15 MG/1
15 TABLET ORAL DAILY
Qty: 30 TABLET | Refills: 1 | Status: SHIPPED | OUTPATIENT
Start: 2022-01-03 | End: 2022-03-31 | Stop reason: SDUPTHER

## 2022-01-03 RX ORDER — METHYLPREDNISOLONE 4 MG/1
TABLET ORAL
Qty: 1 EACH | Refills: 0 | Status: SHIPPED | OUTPATIENT
Start: 2022-01-03 | End: 2022-01-24

## 2022-01-03 NOTE — PATIENT INSTRUCTIONS
Consider [place] boots      This exercise is designed to stretch and strengthen your feet and ankles. Before beginning the exercise, read through all the instructions. While exercising, breathe normally and dont bounce. If you feel any pain, stop the exercise. If pain persists, inform your healthcare provider:  · Stand an arms length away from a wall. Place the palms of your hands on the wall. Step forward about 12 inches with your ______ foot.  · Keeping toes pointed forward and both heels on the floor, bend both knees and lean forward. Hold for __30____ seconds. Relax.  · Repeat ___3___ times. Do ___2-3___ sets a day.  Date Last Reviewed: 8/16/2015  © 4723-3915 [employer]. [address] [place] [address], [place] [address]. All rights reserved. This information is not intended as a substitute for professional medical care. Always follow your healthcare professional's instructions.

## 2022-01-03 NOTE — PROGRESS NOTES
"Subjective:      Patient ID: Cameron Gaona is a 31 y.o. male.    Chief Complaint: Foot Pain (Bilateral feet)    Patient presents to clinic with chief complaint of bilateral bottom heel pain, right worse than left. Symptoms have been ongoing for several months. He reports pain is worsened with the first couple of steps in the morning and with the first couple of steps after prolonged sitting and symptoms improve with increased walking. Describes pain as throbbing, aching in nature.  He denies any recent trauma to the feet. Denies any prior treatments for the heel pain. He works as a  and wears boots throughout the day, otherwise tennis shoes. He does report to increased barefoot walking when at home.     Vitals:    01/03/22 0832   BP: (!) 163/82   Pulse: 85   Weight: 96.6 kg (213 lb)   Height: 5' 5" (1.651 m)   PainSc: 0-No pain      History reviewed. No pertinent past medical history.    History reviewed. No pertinent surgical history.    Family History   Problem Relation Age of Onset    No Known Problems Mother     No Known Problems Father        Social History     Socioeconomic History    Marital status: Single   Tobacco Use    Smoking status: Former Smoker    Smokeless tobacco: Former User   Substance and Sexual Activity    Alcohol use: Yes    Drug use: No       Current Outpatient Medications   Medication Sig Dispense Refill    meloxicam (MOBIC) 15 MG tablet Take 1 tablet (15 mg total) by mouth once daily. 30 tablet 1    methylPREDNISolone (MEDROL DOSEPACK) 4 mg tablet use as directed 1 each 0     No current facility-administered medications for this visit.       Review of patient's allergies indicates:   Allergen Reactions    Shrimp Swelling     Throat swelling           Review of Systems   Constitutional: Negative for chills, fever and malaise/fatigue.   HENT: Negative for hearing loss.    Cardiovascular: Negative for claudication.   Respiratory: Negative for cough, shortness of " breath and wheezing.    Hematologic/Lymphatic: Does not bruise/bleed easily.   Skin: Negative for flushing, nail changes, rash and suspicious lesions.   Musculoskeletal: Positive for myalgias. Negative for back pain and joint pain.   Gastrointestinal: Negative for nausea and vomiting.   Neurological: Negative for loss of balance, numbness, paresthesias and sensory change.   Psychiatric/Behavioral: Negative for altered mental status.           Objective:      Physical Exam  Constitutional:       General: He is not in acute distress.     Appearance: He is obese. He is not ill-appearing.   Cardiovascular:      Pulses:           Dorsalis pedis pulses are 2+ on the right side and 2+ on the left side.        Posterior tibial pulses are 2+ on the right side and 2+ on the left side.      Comments: CFT< 3 secs all toes bilateral foot, skin temp warm to cool bilateral foot,  hair growth present to bilateral lower extremity, no edema to bilateral lower extremities    Musculoskeletal:      Comments: Mild pain on palpation plantar medial and central heel, right foot. No pain with ROM or MMT. No pain with medial and lateral compression of heel. No pain on palpation along the tibialis posterior tendon.     Ankle joint dorsiflexion reaches neutral with the knee extended bilateral    5/5 muscle strength with DF/PF/Inv/Ev bilateral    Pes planus foot type bilateral                Skin:     General: Skin is warm.      Capillary Refill: Capillary refill takes less than 2 seconds.      Findings: No ecchymosis or erythema.      Nails: There is no clubbing.      Comments: Skin warm, dry, intact to bilateral feet. No open wounds, no discoloration, no signs of infection   Neurological:      Mental Status: He is alert and oriented to person, place, and time.      Sensory: Sensation is intact.      Motor: Motor function is intact.      Comments: Negative tinel sign to percussion bilateral.                Assessment:       Encounter Diagnoses  "  Name Primary?    Plantar fasciitis     Chronic pain of right heel Yes         Plan:       Cameron was seen today for foot pain.    Diagnoses and all orders for this visit:    Chronic pain of right heel    Plantar fasciitis  -     Ambulatory referral/consult to Podiatry  -     X-Ray Calcaneus Bilateral; Future    Other orders  -     methylPREDNISolone (MEDROL DOSEPACK) 4 mg tablet; use as directed  -     meloxicam (MOBIC) 15 MG tablet; Take 1 tablet (15 mg total) by mouth once daily.      I counseled the patient on his conditions, their implications and medical management.    Baseline bilateral calcaneal x-ray ordered.    Discussed conservative care such as appropriate stretching, shoe gear modifications and arch supports/orthotics, corticosteroid injections, physical therapy.     Dispensed literature on and demonstrated calf muscle stretches. Reviewed appropriate shoe gear and discussed activity modification such as avoiding flat shoes and barefoot walking. Recommend 1/2-1" heel height.    Recommended red waiting work boots since they can fabricate insoles for the steel toed boots.    Prescribed Medrol Dosepak to help with initial inflammation and to take meloxicam as needed.    Rest, ice and elevation p.r.n. as discussed.    Discussed corticosteroid injection if conservative care does not continue to help within the next 4-6 weeks.    RTC p.r.n. as discussed.    Assisted per Radha Maldonado DPM PGY 3    I have personally taken the history and examined this patient and agree with the resident's note as stated as above.   Cj Munoz DPM, FACFAS    A portion of this note was generated by voice recognition software and may contain spelling and grammar errors.            "

## 2022-02-16 ENCOUNTER — PATIENT OUTREACH (OUTPATIENT)
Dept: ADMINISTRATIVE | Facility: OTHER | Age: 32
End: 2022-02-16
Payer: COMMERCIAL

## 2022-02-16 NOTE — PROGRESS NOTES
Health Maintenance Due   Topic Date Due    Influenza Vaccine (1) 09/01/2021    COVID-19 Vaccine (3 - Booster) 02/10/2022     Updates were requested from care everywhere.  Chart was reviewed for overdue Proactive [facility] Encounters ([facility]) topics (CRS, Breast Cancer Screening, Eye exam)  Health Maintenance has been updated.  LINKS immunization registry triggered.  Immunizations were reconciled.

## 2022-02-17 ENCOUNTER — OFFICE VISIT (OUTPATIENT)
Dept: PODIATRY | Facility: CLINIC | Age: 32
End: 2022-02-17
Payer: COMMERCIAL

## 2022-02-17 VITALS
WEIGHT: 212.94 LBS | SYSTOLIC BLOOD PRESSURE: 129 MMHG | BODY MASS INDEX: 35.48 KG/M2 | DIASTOLIC BLOOD PRESSURE: 80 MMHG | HEART RATE: 76 BPM | HEIGHT: 65 IN

## 2022-02-17 DIAGNOSIS — M76.71 PERONEAL TENDONITIS, RIGHT: ICD-10-CM

## 2022-02-17 DIAGNOSIS — M76.821 POSTERIOR TIBIAL TENDINITIS, RIGHT: Primary | ICD-10-CM

## 2022-02-17 DIAGNOSIS — G89.29 CHRONIC PAIN OF RIGHT ANKLE: ICD-10-CM

## 2022-02-17 DIAGNOSIS — M25.571 CHRONIC PAIN OF RIGHT ANKLE: ICD-10-CM

## 2022-02-17 PROCEDURE — 1159F PR MEDICATION LIST DOCUMENTED IN MEDICAL RECORD: ICD-10-PCS | Mod: CPTII,S$GLB,, | Performed by: PODIATRIST

## 2022-02-17 PROCEDURE — 99213 OFFICE O/P EST LOW 20 MIN: CPT | Mod: S$GLB,,, | Performed by: PODIATRIST

## 2022-02-17 PROCEDURE — 99999 PR PBB SHADOW E&M-EST. PATIENT-LVL IV: CPT | Mod: PBBFAC,,, | Performed by: PODIATRIST

## 2022-02-17 PROCEDURE — 99999 PR PBB SHADOW E&M-EST. PATIENT-LVL IV: ICD-10-PCS | Mod: PBBFAC,,, | Performed by: PODIATRIST

## 2022-02-17 PROCEDURE — 3008F PR BODY MASS INDEX (BMI) DOCUMENTED: ICD-10-PCS | Mod: CPTII,S$GLB,, | Performed by: PODIATRIST

## 2022-02-17 PROCEDURE — 3074F SYST BP LT 130 MM HG: CPT | Mod: CPTII,S$GLB,, | Performed by: PODIATRIST

## 2022-02-17 PROCEDURE — 99213 PR OFFICE/OUTPT VISIT, EST, LEVL III, 20-29 MIN: ICD-10-PCS | Mod: S$GLB,,, | Performed by: PODIATRIST

## 2022-02-17 PROCEDURE — 3079F PR MOST RECENT DIASTOLIC BLOOD PRESSURE 80-89 MM HG: ICD-10-PCS | Mod: CPTII,S$GLB,, | Performed by: PODIATRIST

## 2022-02-17 PROCEDURE — 3074F PR MOST RECENT SYSTOLIC BLOOD PRESSURE < 130 MM HG: ICD-10-PCS | Mod: CPTII,S$GLB,, | Performed by: PODIATRIST

## 2022-02-17 PROCEDURE — 3008F BODY MASS INDEX DOCD: CPT | Mod: CPTII,S$GLB,, | Performed by: PODIATRIST

## 2022-02-17 PROCEDURE — 3079F DIAST BP 80-89 MM HG: CPT | Mod: CPTII,S$GLB,, | Performed by: PODIATRIST

## 2022-02-17 PROCEDURE — 1160F RVW MEDS BY RX/DR IN RCRD: CPT | Mod: CPTII,S$GLB,, | Performed by: PODIATRIST

## 2022-02-17 PROCEDURE — 1159F MED LIST DOCD IN RCRD: CPT | Mod: CPTII,S$GLB,, | Performed by: PODIATRIST

## 2022-02-17 PROCEDURE — 1160F PR REVIEW ALL MEDS BY PRESCRIBER/CLIN PHARMACIST DOCUMENTED: ICD-10-PCS | Mod: CPTII,S$GLB,, | Performed by: PODIATRIST

## 2022-02-17 NOTE — PROGRESS NOTES
"Subjective:      Patient ID: Cameron Gaona is a 31 y.o. male.    Chief Complaint: Ankle Pain (Right)    Patient presents to clinic with chief complaint of bilateral bottom heel pain, right worse than left. Symptoms have been ongoing for several months. He reports pain is worsened with the first couple of steps in the morning and with the first couple of steps after prolonged sitting and symptoms improve with increased walking. Describes pain as throbbing, aching in nature.  He denies any recent trauma to the feet. Denies any prior treatments for the heel pain. He works as a  and wears boots throughout the day, otherwise tennis shoes. He does report to increased barefoot walking when at home.     02/17/2022:  Relates that previous plantar heel pain has resolved.  He has developed pain along the medial aspect of the right ankle which has been present for the past several months and most likely was overshadowed by the heel pain at the last visit.  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  Pain alleviated with rest however it is sometimes present rest.  No longer taking meloxicam since he did not notice a big difference.  Walking with tennis shoes.  He did not purchase new work boots as discussed.  He is using over-the-counter ankle brace which helps somewhat to the right ankle.    Vitals:    02/17/22 1131   BP: 129/80   Pulse: 76   Weight: 96.6 kg (212 lb 15.4 oz)   Height: 5' 5" (1.651 m)   PainSc:   7   PainLoc: Ankle      History reviewed. No pertinent past medical history.    History reviewed. No pertinent surgical history.    Family History   Problem Relation Age of Onset    No Known Problems Mother     No Known Problems Father        Social History     Socioeconomic History    Marital status: Single   Tobacco Use    Smoking status: Former Smoker    Smokeless tobacco: Former User "   Substance and Sexual Activity    Alcohol use: Yes    Drug use: No       Current Outpatient Medications   Medication Sig Dispense Refill    meloxicam (MOBIC) 15 MG tablet Take 1 tablet (15 mg total) by mouth once daily. 30 tablet 1     No current facility-administered medications for this visit.       Review of patient's allergies indicates:   Allergen Reactions    Shrimp Swelling     Throat swelling           Review of Systems   Constitutional: Negative for chills, fever and malaise/fatigue.   HENT: Negative for hearing loss.    Cardiovascular: Negative for claudication.   Respiratory: Negative for cough, shortness of breath and wheezing.    Hematologic/Lymphatic: Does not bruise/bleed easily.   Skin: Negative for flushing, nail changes, rash and suspicious lesions.   Musculoskeletal: Positive for myalgias. Negative for back pain and joint pain.   Gastrointestinal: Negative for nausea and vomiting.   Neurological: Negative for loss of balance, numbness, paresthesias and sensory change.   Psychiatric/Behavioral: Negative for altered mental status.           Objective:      Physical Exam  Constitutional:       General: He is not in acute distress.     Appearance: He is obese. He is not ill-appearing.   Cardiovascular:      Pulses:           Dorsalis pedis pulses are 2+ on the right side and 2+ on the left side.        Posterior tibial pulses are 2+ on the right side and 2+ on the left side.      Comments: CFT< 3 secs all toes bilateral foot, skin temp warm to cool bilateral foot,  hair growth present to bilateral lower extremity, no edema to bilateral lower extremities    Musculoskeletal:      Comments: Flexible pes planovalgus foot type bilateral.  Slight pain on palpation along the course of the posterior tibial tendon commencing posterior to the medial malleolus and extending to the navicular tuberosity.  Pain with active resisted inversion of the right foot.  There is also pain on palpation along the course  of peroneal tendons commencing posterior to the lateral malleolus and extending to the lateral hindfoot that is aggravated by active resistive eversion in a plantar flexed position.  No pain with range of motion to the right foot ankle.  No pain with stress eversion or inversion right ankle.  Negative anterior drawer sign.    Note -15 degrees of dorsiflexion with the knee extended bilateral that reduces to near 0° with the knee flexed.             Skin:     General: Skin is warm.      Capillary Refill: Capillary refill takes less than 2 seconds.      Findings: No ecchymosis or erythema.      Nails: There is no clubbing.      Comments: Skin warm, dry, intact to bilateral feet. No open wounds, no discoloration, no signs of infection   Neurological:      Mental Status: He is alert and oriented to person, place, and time.      Sensory: Sensation is intact.      Motor: Motor function is intact.      Comments: Negative tinel sign to percussion bilateral.                Assessment:       Encounter Diagnoses   Name Primary?    Posterior tibial tendinitis, right Yes    Peroneal tendonitis, right     Chronic pain of right ankle          Plan:       Cameron was seen today for ankle pain.    Diagnoses and all orders for this visit:    Posterior tibial tendinitis, right  -     Ambulatory referral/consult to Physical/Occupational Therapy; Future    Peroneal tendonitis, right  -     Ambulatory referral/consult to Physical/Occupational Therapy; Future    Chronic pain of right ankle  -     Ambulatory referral/consult to Physical/Occupational Therapy; Future      I counseled the patient on his conditions, their implications and medical management.    Fitted, dispensed and applied right ankle brace with straps to properly secure the foot in a more neutral alignment help reduce the pressure off the posterior tibial and peroneal tendons.  We discussed avoiding barefoot walking in flat shoes recommending up to 1 in heel height to help  offset the equinus.  Referred to physical therapy in order to help reduce the equinus contracture reduce the pain overlying the tendons.  He will require gradual strengthening in order to prevent further injury and recurrent injury.    Patient refused further anti-inflammatory medication.  Started rest, ice and elevate p.r.n..    RTC within 6 weeks or p.r.n. as discussed.  If no significant improvement may consider an MRI as well as follow-up x-rays.    A portion of this note was generated by voice recognition software and may contain spelling and grammar errors.

## 2022-02-21 ENCOUNTER — PATIENT MESSAGE (OUTPATIENT)
Dept: PODIATRY | Facility: CLINIC | Age: 32
End: 2022-02-21
Payer: COMMERCIAL

## 2022-02-22 ENCOUNTER — TELEPHONE (OUTPATIENT)
Dept: PODIATRY | Facility: CLINIC | Age: 32
End: 2022-02-22
Payer: COMMERCIAL

## 2022-02-22 NOTE — TELEPHONE ENCOUNTER
Spoke with  Jiemnez to reassure him that his doctor's statement of employee work status was received and that we have his name attached to it. 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  Jimenez verbalized understanding. No other needs voiced.

## 2022-02-22 NOTE — TELEPHONE ENCOUNTER
----- Message from Samantha Fatima sent at 2/22/2022 12:13 PM CST -----  Regarding: turning in his work form  Contact: [phone]  Patient is requesting a call back regarding he didn't put his name on the [employer] form. He brought it in today.   Would the patient rather a call back or a response via [employer]?  call  Best Call Back Number:  [phone]  Additional Information:

## 2022-02-28 ENCOUNTER — TELEPHONE (OUTPATIENT)
Dept: PODIATRY | Facility: CLINIC | Age: 32
End: 2022-02-28
Payer: COMMERCIAL

## 2022-02-28 ENCOUNTER — PATIENT MESSAGE (OUTPATIENT)
Dept: PODIATRY | Facility: CLINIC | Age: 32
End: 2022-02-28
Payer: COMMERCIAL

## 2022-02-28 NOTE — TELEPHONE ENCOUNTER
Called to inform Mr Gaona that his doctors statement for employee work status had been filled out per Dr. Munoz and faxed to [employer] at [phone]. Also informed him that I did call to ensure that the paperwork was received, but had to leave a voice message. Per Mr. Gaona someone just called him to inform him that his paperwork was received. Will plan to upload paperwork into Mr. Gaona pt portal so if he needs a copy of his paperwork on hand he will have it. No other needs voiced at this time.

## 2022-03-02 ENCOUNTER — PATIENT MESSAGE (OUTPATIENT)
Dept: PODIATRY | Facility: CLINIC | Age: 32
End: 2022-03-02
Payer: COMMERCIAL

## 2022-03-03 ENCOUNTER — CLINICAL SUPPORT (OUTPATIENT)
Dept: REHABILITATION | Facility: HOSPITAL | Age: 32
End: 2022-03-03
Attending: PODIATRIST
Payer: COMMERCIAL

## 2022-03-03 DIAGNOSIS — G89.29 CHRONIC PAIN OF RIGHT ANKLE: ICD-10-CM

## 2022-03-03 DIAGNOSIS — M25.671 DECREASED RANGE OF MOTION OF RIGHT ANKLE: ICD-10-CM

## 2022-03-03 DIAGNOSIS — M76.71 PERONEAL TENDONITIS, RIGHT: ICD-10-CM

## 2022-03-03 DIAGNOSIS — M76.821 POSTERIOR TIBIAL TENDINITIS, RIGHT: ICD-10-CM

## 2022-03-03 DIAGNOSIS — M25.571 CHRONIC PAIN OF RIGHT ANKLE: ICD-10-CM

## 2022-03-03 PROCEDURE — 97161 PT EVAL LOW COMPLEX 20 MIN: CPT | Mod: PO

## 2022-03-08 ENCOUNTER — PATIENT MESSAGE (OUTPATIENT)
Dept: PODIATRY | Facility: CLINIC | Age: 32
End: 2022-03-08
Payer: COMMERCIAL

## 2022-03-08 ENCOUNTER — TELEPHONE (OUTPATIENT)
Dept: PODIATRY | Facility: CLINIC | Age: 32
End: 2022-03-08
Payer: COMMERCIAL

## 2022-03-08 NOTE — TELEPHONE ENCOUNTER
Pt's disability forms were scanned into chart and faxed to [facility] [phone]. Confirmation was received. Pt notified.

## 2022-03-11 PROBLEM — M25.571 CHRONIC PAIN OF RIGHT ANKLE: Status: ACTIVE | Noted: 2022-03-11

## 2022-03-11 PROBLEM — M25.671 DECREASED RANGE OF MOTION OF RIGHT ANKLE: Status: ACTIVE | Noted: 2022-03-11

## 2022-03-11 PROBLEM — G89.29 CHRONIC PAIN OF RIGHT ANKLE: Status: ACTIVE | Noted: 2022-03-11

## 2022-03-11 NOTE — PLAN OF CARE
[facility]   Physical Therapy Initial Evaluation     Date: 3/3/2022   Name: Cameron Gaona  Clinic Number: [id number]    Therapy Diagnosis:   Encounter Diagnoses   Name Primary?    Posterior tibial tendinitis, right     Peroneal tendonitis, right     Chronic pain of right ankle     Decreased range of motion of right ankle      Physician: Cj Munoz DPM    Physician Orders: PT Eval and Treat   Medical Diagnosis from Referral:   M76.821 (ICD-10-CM) - Posterior tibial tendinitis, right   M76.71 (ICD-10-CM) - Peroneal tendonitis, right   M25.571,G89.29 (ICD-10-CM) - Chronic pain of right ankle       Evaluation Date: 3/3/2022  Authorization Period Expiration: 04/01/2022  Plan of Care Expiration: 5/3/2022  Progress Note Due: 4/3/2022  Visit # / Visits authorized: 1/ 1   FOTO: 0/5    Precautions: Standard     Time In: 4:56 pm  Time Out: 5:30 pm  Total Appointment Time (timed & untimed codes): 34 minutes      SUBJECTIVE     Date of onset: February 2021    History of current condition - Cameron reports: that he had pain for about 4 months before he went to the doctor because it was unbearable. The first thing that the doctor did was give him steroid pills and meloxicam for plantar fasciitis. Pt states that after he took the pills, he feels like his pain became worse. Pt explains that it was still hurting after he went to the doctor, and when he put on his work boots the pain would become worse.  Pt states he went back to the doctor in 2/2022, he notified the doctor that he was still having some pain in his R ankle and the doctor gave him an ankle brace. The doctor told him to wear the ankle brace all day everyday.    Falls: None reported    Imaging, X-ray B calcaneous  Impression:     No evidence of fracture.No significant degenerative changes.  No evidence for osseous calcaneal spur or calcaneal fracture.    Prior Therapy: No  Social History: lives with mother and his  son  Occupation:   Prior Level of Function: Independent  Current Level of Function: Independent    Pain:  Current 5/10, worst 8/10, best 0/10   Location: R ankle and heel  Description: Aching  Aggravating Factors: Standing, Driving, Night Time and Morning  Easing Factors: ankle brace provided by his physician (alleviates ankle pain), walking (alleviates plantar fasciitis)    Patients goals: standing, driving with no pain     Medical History:   No past medical history on file.    Surgical History:   Cameron Gaona  has no past surgical history on file.    Medications:   Cameron has a current medication list which includes the following prescription(s): meloxicam.    Allergies:   Review of patient's allergies indicates:   Allergen Reactions    Shrimp Swelling     Throat swelling            OBJECTIVE     Observation: Pt is a 32 yo M presenting to therapy in no apparent distress. Pt has increased difficulty with heel raises SLS R>L and ankle control.      Gait: No apparent gait abnormalities with some pain in R ankle (due to not wearing ankle brace) upon walking steadily for 3 minutes for assessment.    Range of Motion: AROM (PROM):    Ankle Left Right   Dorsiflexion 10 (12) degrees 10 (12) degrees   Plantarflexion 40 (55) degrees 40 (55) degrees   Inversion 38 (48) degrees 30 (38) degrees   Eversion 42 (45) degrees 20 (38) degrees   Subtalar Inv. NT NT   Subtalar Ever. NT NT       Strength:  Hip Left Right   Flexion 5/5 5/5   Abduction 5/5 4+/5   Adduction 5/5 5/5   Extension 5/5 4+/5   External Rot NT NT   Internal Rot NT NT     Knee Left Right   Extension 5/5 5/5   Flexion 4+/5 4+/5     Ankle Left Right   Dorsiflexion 5/5 5/5   Plantarflexion 5/5 5/5   Inversion 4+/5 4-/5*   Eversion 4+/5 4/5       Special Tests:    Ankle Left Right   Anterior Drawer Test Negative Negative   Squeeze test Negative Negative   Trivedi test NT NT   Subtalar Neutral NT NT     Special Tests:  SLS (L) - 14s  SLS  (R) - 11s    Palpation: No TTP noted    Sensation: grossly intact      Limitation/Restriction for FOTO Ankle Survey    Therapist reviewed FOTO scores for Cameron Gaona on 3/3/2022.   FOTO documents entered into [employer] - see Media section.    Limitation Score: 37%         TREATMENT     Total Treatment time (time-based codes) separate from Evaluation: 0 minutes      Cameron received the treatments listed below:      therapeutic exercises to develop ROM for 0 minutes including:    Date 3/3/2022   Visit 1/1   POC exp 5/3/2022   PN 4/3/2022   FTF 4/3/2022   FOTO 0/5               bike    Mat:    gastroc stretch    Ankle tband   4 directions    BAPS (seated)    Towel crunch    Towel inv/ev    Ankle AROM    Standing:    Heel raise    BAPS    SLS    Seen by EG         PATIENT EDUCATION AND HOME EXERCISES     Education provided:   - importance of consistent performance of HEP  - role of PT/PTA    Written Home Exercises Provided: yes. Exercises were reviewed and Cameron was able to demonstrate them prior to the end of the session.  Cameron demonstrated good  understanding of the education provided. See EMR under Patient Instructions for exercises provided during therapy sessions.    ASSESSMENT     Cameron is a 31 y.o. male referred to outpatient Physical Therapy with a medical diagnosis of posterior tibial tendonitis and peroneal tendonitis. Patient presents without wearing the ankle brace provided by his doctor to be worn 24/7. PT emphasizes the importance of compliance with his provider's care plan in order to decrease pain and make improvements toward recovering. Pt displays some strength, ROM, and balance deficits.     Patient prognosis is Good.   Patient will benefit from skilled outpatient Physical Therapy to address the deficits stated above and in the chart below, provide patient /family education, and to maximize patient's level of independence.     Plan of care discussed with patient: Yes  Patient's spiritual,  cultural and educational needs considered and patient is agreeable to the plan of care and goals as stated below:     Anticipated Barriers for therapy: None    Medical Necessity is demonstrated by the following  History  Co-morbidities and personal factors that may impact the plan of care Co-morbidities:   None    Personal Factors:   no deficits     low   Examination  Body Structures and Functions, activity limitations and participation restrictions that may impact the plan of care Body Regions:   lower extremities    Body Systems:    ROM  Strength  Gait  Transfers  Transitions    Participation Restrictions:   None    Activity limitations:   Learning and applying knowledge  no deficits    General Tasks and Commands  no deficits    Communication  no deficits    Mobility  lifting and carrying objects  moving around using equipment (WC)  using transportation (bus, train, plane, car)  driving (bike, car, motorcycle)    Self care  no deficits    Domestic Life  shopping  cooking  doing house work (cleaning house, washing dishes, laundry)    Interactions/Relationships  no deficits    Life Areas  no deficits    Community and Social Life  community life  recreation and leisure         low   Clinical Presentation stable and uncomplicated low   Decision Making/ Complexity Score: low     Goals:  Short Term Goals: 4 weeks   1. This patient will be independent with a basic HEP. In progress, not met  2. This patient will have a pain rating of 5/10 at worst with ADLs. In progress, not met  3. Patient able to score greater than or equal to 75% on the FOTO Ankle Survey indicating patient is 25% impaired, limited, or restricted and demonstrating overall decreased ankle pain with functional activities. In progress, not met     Long Term Goals 8 weeks   1. This patient will be independent with an updated HEP. In progress, not met  2. This patient will increase B LE strength to 5/5 in order to be able to perform his job responsibilities  with no complaints of pain. In progress, not met  3. This patient will have a pain rating of 2/10 at worst with ADLs. In progress, not met  4. Patient able to score greater than or equal to 85% on the FOTO Ankle Survey indicating patient is 15% impaired, limited, or restricted and demonstrating overall decreased ankle pain with functional activities. In progress, not met    PLAN   Plan of care Certification: 3/3/2022 to 5/3/2022.    Outpatient Physical Therapy 2 times weekly for 8 weeks to include the following interventions: Manual Therapy, Moist Heat/ Ice, Neuromuscular Re-ed, Patient Education, Therapeutic Activities and Therapeutic Exercise.     Kim Weber, PT      I CERTIFY THE NEED FOR THESE SERVICES FURNISHED UNDER THIS PLAN OF TREATMENT AND WHILE UNDER MY CARE   Physician's comments:     Physician's Signature: ___________________________________________________

## 2022-03-15 ENCOUNTER — CLINICAL SUPPORT (OUTPATIENT)
Dept: REHABILITATION | Facility: HOSPITAL | Age: 32
End: 2022-03-15
Attending: PODIATRIST
Payer: COMMERCIAL

## 2022-03-15 DIAGNOSIS — M25.571 CHRONIC PAIN OF RIGHT ANKLE: Primary | ICD-10-CM

## 2022-03-15 DIAGNOSIS — M25.671 DECREASED RANGE OF MOTION OF RIGHT ANKLE: ICD-10-CM

## 2022-03-15 DIAGNOSIS — G89.29 CHRONIC PAIN OF RIGHT ANKLE: Primary | ICD-10-CM

## 2022-03-15 PROCEDURE — 97140 MANUAL THERAPY 1/> REGIONS: CPT | Mod: PO,CQ

## 2022-03-15 PROCEDURE — 97110 THERAPEUTIC EXERCISES: CPT | Mod: PO,CQ

## 2022-03-15 NOTE — PROGRESS NOTES
"[facility]   Physical Therapy Treatment Note     Name: Cameron Gaona  Clinic Number: [id number]    Therapy Diagnosis:   Encounter Diagnoses   Name Primary?    Chronic pain of right ankle Yes    Decreased range of motion of right ankle      Physician: Cj Munoz DPM    Visit Date: 3/15/2022    Physician Orders: PT Eval and Treat   Medical Diagnosis from Referral:   M76.821 (ICD-10-CM) - Posterior tibial tendinitis, right   M76.71 (ICD-10-CM) - Peroneal tendonitis, right   M25.571,G89.29 (ICD-10-CM) - Chronic pain of right ankle         Evaluation Date: 3/3/2022  Authorization Period Expiration: 04/01/2022  Plan of Care Expiration: 5/3/2022  Progress Note Due: 4/3/2022  Visit # / Visits authorized: 1/ 20   FOTO: 1/5     Precautions: Standard         PTA Visit #: 1/5     Time In: 2:30  Time Out: 3:15  Total Billable Time: 45 minutes    SUBJECTIVE     Pt reports: hurts only when I turn my foot in .  He was compliant with home exercise program.  Response to previous treatment: none  Functional change: none    Pain: 2/10  Location: right ankles     OBJECTIVE     Objective Measures updated at progress report unless specified.     Treatment     Cameron received the treatments listed below:      therapeutic exercises to develop ROM for 40 minutes including:     Date 3/15/2022 3/3/2022   Visit 1/20 1/1   POC exp 5/3/2022 5/3/2022   PN 4/3/2022 4/3/2022   FTF 4/3/2022 4/3/2022   FOTO 1/5 0/5                        bike 5 min L3      Mat:      gastroc stretch 3 x 30"      Ankle tband   4 directions 30x GTB plantar/dors  30x  RTB  Inv/ever     BAPS (seated) 20x   L1   Fwd/bwd  Side to side   ccw/cw     Towel crunch 20x      Towel inv/ev      Ankle AROM      Standing:      Heel raise      BAPS      SLS      Seen by EM EG      manual therapy techniques: Myofacial release were applied to the: R ankle for 15 minutes, including:  STIM to peroneal           PATIENT EDUCATION AND HOME EXERCISES "      Education provided:   - importance of consistent performance of HEP  - role of PT/PTA     Written Home Exercises Provided: yes. Exercises were reviewed and Cameron was able to demonstrate them prior to the end of the session.  Cameron demonstrated good  understanding of the education provided. See EMR under Patient Instructions for exercises provided during therapy sessions.    ASSESSMENT     Patient arrived to session with mild complaints of R ankle pain, especially with ankle inversion. Patient felt relief after manual and exercises caused no increases in pain. Patient ended session stating he felt better than when he arrived.     Cameron Is progressing well towards his goals.   Pt prognosis is Good.     Pt will continue to benefit from skilled outpatient physical therapy to address the deficits listed in the problem list box on initial evaluation, provide pt/family education and to maximize pt's level of independence in the home and community environment.     Pt's spiritual, cultural and educational needs considered and pt agreeable to plan of care and goals.     Anticipated barriers to physical therapy: none    Goals:  Short Term Goals: 4 weeks   1. This patient will be independent with a basic HEP. In progress, not met  2. This patient will have a pain rating of 5/10 at worst with ADLs. In progress, not met  3. Patient able to score greater than or equal to 75% on the FOTO Ankle Survey indicating patient is 25% impaired, limited, or restricted and demonstrating overall decreased ankle pain with functional activities. In progress, not met     Long Term Goals 8 weeks   1. This patient will be independent with an updated HEP. In progress, not met  2. This patient will increase B LE strength to 5/5 in order to be able to perform his job responsibilities with no complaints of pain. In progress, not met  3. This patient will have a pain rating of 2/10 at worst with ADLs. In progress, not met  4. Patient able to  score greater than or equal to 85% on the FOTO Ankle Survey indicating patient is 15% impaired, limited, or restricted and demonstrating overall decreased ankle pain with functional activities. In progress, not met    PLAN     Follow up on patient response to session     Ezekiel Barnett PTA

## 2022-03-17 ENCOUNTER — CLINICAL SUPPORT (OUTPATIENT)
Dept: REHABILITATION | Facility: HOSPITAL | Age: 32
End: 2022-03-17
Attending: PODIATRIST
Payer: COMMERCIAL

## 2022-03-17 DIAGNOSIS — M25.571 CHRONIC PAIN OF RIGHT ANKLE: Primary | ICD-10-CM

## 2022-03-17 DIAGNOSIS — M25.671 DECREASED RANGE OF MOTION OF RIGHT ANKLE: ICD-10-CM

## 2022-03-17 DIAGNOSIS — G89.29 CHRONIC PAIN OF RIGHT ANKLE: Primary | ICD-10-CM

## 2022-03-17 PROCEDURE — 97110 THERAPEUTIC EXERCISES: CPT | Mod: PO

## 2022-03-17 NOTE — PROGRESS NOTES
"[facility]   Physical Therapy Treatment Note     Name: Cameron Gaona  Clinic Number: [id number]    Therapy Diagnosis:   Encounter Diagnoses   Name Primary?    Chronic pain of right ankle Yes    Decreased range of motion of right ankle      Physician: Cj Munoz DPM    Visit Date: 3/17/2022    Physician Orders: PT Eval and Treat   Medical Diagnosis from Referral:   M76.821 (ICD-10-CM) - Posterior tibial tendinitis, right   M76.71 (ICD-10-CM) - Peroneal tendonitis, right   M25.571,G89.29 (ICD-10-CM) - Chronic pain of right ankle         Evaluation Date: 3/3/2022  Authorization Period Expiration: 04/01/2022  Plan of Care Expiration: 5/3/2022  Progress Note Due: 4/3/2022  Visit # / Visits authorized: 2/ 20   FOTO: 2/5     Precautions: Standard         PTA Visit #: 1/5     Time In: 5:00 pm  Time Out: 5:45 pm  Total Billable Time: 45 minutes    SUBJECTIVE     Pt reports: he feels good today.  He was compliant with home exercise program.  Response to previous treatment: he was sore for a couple hours afterwards and then felt good  Functional change: none    Pain: 0/10  Location: right ankles     OBJECTIVE     Objective Measures updated at progress report unless specified.     Treatment     Cameron received the treatments listed below:      therapeutic exercises to develop ROM for 38 minutes including:     Date 3/17/2022 3/15/2022 3/3/2022   Visit 2/20 1/20 1/1   POC exp 5/3/2022 5/3/2022 5/3/2022   PN 4/3/2022 4/3/2022 4/3/2022   FTF 4/3/2022 4/3/2022 4/3/2022   FOTO 2/5 1/5 0/5                           bike 5' at L6 5 min L3      Mat:       gastroc stretch 3 x 30" 3 x 30"      Soleus str 30" (switch to standing)     Ankle tband   4 directions 20x GTB 30x GTB plantar/dors  30x  RTB  Inv/ever     BAPS (seated)  20x   L1   Fwd/bwd  Side to side   ccw/cw     Towel crunch  20x      Towel inv/ev       Ankle AROM       Ankle ABCs 1x     Standing:       gastroc/soleus str 3 x 30" ea   " "  squats      Heel raise       BAPS       SLS 4 x 15" on foam      Seen by EG EM EG      manual therapy techniques: Myofacial release were applied to the: R ankle for 7 minutes, including:  STIM to peroneal   - IASTM and STM to peroneal tendons and posterior tib tendon          PATIENT EDUCATION AND HOME EXERCISES      Education provided:   - importance of consistent performance of HEP  - role of PT/PTA     Written Home Exercises Provided: yes. Exercises were reviewed and Cameron was able to demonstrate them prior to the end of the session.  Cameron demonstrated good  understanding of the education provided. See EMR under Patient Instructions for exercises provided during therapy sessions.    ASSESSMENT     Cameron presents to therapy with his ankle brace in his hand, not wearing it currently, but he states that he wore the ankle brace all day. He explains that he has trouble fitting his brace inside his shoe. He is able to tolerate all prescribed activities well, stating that he feels good at the completion of session and that he was challenged by all prescribed activities. He requires minimal VCs and TCs for appropriate body mechanics and for postural awareness in order to avoid compensatory movements. He tolerates MT well and states it makes his ankle feel better.    Patient arrived to session with mild complaints of R ankle pain, especially with ankle inversion. Patient felt relief after manual and exercises caused no increases in pain. Patient ended session stating he felt better than when he arrived.     Cameron Is progressing well towards his goals.   Pt prognosis is Good.     Pt will continue to benefit from skilled outpatient physical therapy to address the deficits listed in the problem list box on initial evaluation, provide pt/family education and to maximize pt's level of independence in the home and community environment.     Pt's spiritual, cultural and educational needs considered and pt agreeable to " plan of care and goals.     Anticipated barriers to physical therapy: none    Goals:  Short Term Goals: 4 weeks   1. This patient will be independent with a basic HEP. In progress, not met  2. This patient will have a pain rating of 5/10 at worst with ADLs. In progress, not met  3. Patient able to score greater than or equal to 75% on the FOTO Ankle Survey indicating patient is 25% impaired, limited, or restricted and demonstrating overall decreased ankle pain with functional activities. In progress, not met     Long Term Goals 8 weeks   1. This patient will be independent with an updated HEP. In progress, not met  2. This patient will increase B LE strength to 5/5 in order to be able to perform his job responsibilities with no complaints of pain. In progress, not met  3. This patient will have a pain rating of 2/10 at worst with ADLs. In progress, not met  4. Patient able to score greater than or equal to 85% on the FOTO Ankle Survey indicating patient is 15% impaired, limited, or restricted and demonstrating overall decreased ankle pain with functional activities. In progress, not met    PLAN     Continue to increase performance of therex and neuromuscular re-education activities.    Kim Weber, PT

## 2022-03-18 ENCOUNTER — OFFICE VISIT (OUTPATIENT)
Dept: FAMILY MEDICINE | Facility: CLINIC | Age: 32
End: 2022-03-18
Payer: COMMERCIAL

## 2022-03-18 VITALS
HEART RATE: 80 BPM | WEIGHT: 217.25 LBS | TEMPERATURE: 98 F | HEIGHT: 65 IN | OXYGEN SATURATION: 97 % | BODY MASS INDEX: 36.19 KG/M2 | DIASTOLIC BLOOD PRESSURE: 78 MMHG | SYSTOLIC BLOOD PRESSURE: 120 MMHG

## 2022-03-18 DIAGNOSIS — G47.8 POOR SLEEP PATTERN: ICD-10-CM

## 2022-03-18 DIAGNOSIS — R06.89 HEAVY BREATHING: ICD-10-CM

## 2022-03-18 DIAGNOSIS — J30.1 ALLERGIC RHINITIS DUE TO POLLEN, UNSPECIFIED SEASONALITY: ICD-10-CM

## 2022-03-18 DIAGNOSIS — J34.3 HYPERTROPHY OF BOTH INFERIOR NASAL TURBINATES: ICD-10-CM

## 2022-03-18 DIAGNOSIS — R40.0 DAYTIME SOMNOLENCE: Primary | ICD-10-CM

## 2022-03-18 DIAGNOSIS — R53.83 FATIGUE, UNSPECIFIED TYPE: ICD-10-CM

## 2022-03-18 DIAGNOSIS — R06.83 SNORING: ICD-10-CM

## 2022-03-18 PROCEDURE — 3074F PR MOST RECENT SYSTOLIC BLOOD PRESSURE < 130 MM HG: ICD-10-PCS | Mod: CPTII,S$GLB,, | Performed by: FAMILY MEDICINE

## 2022-03-18 PROCEDURE — 99214 PR OFFICE/OUTPT VISIT, EST, LEVL IV, 30-39 MIN: ICD-10-PCS | Mod: S$GLB,,, | Performed by: FAMILY MEDICINE

## 2022-03-18 PROCEDURE — 99214 OFFICE O/P EST MOD 30 MIN: CPT | Mod: S$GLB,,, | Performed by: FAMILY MEDICINE

## 2022-03-18 PROCEDURE — 3078F DIAST BP <80 MM HG: CPT | Mod: CPTII,S$GLB,, | Performed by: FAMILY MEDICINE

## 2022-03-18 PROCEDURE — 1159F PR MEDICATION LIST DOCUMENTED IN MEDICAL RECORD: ICD-10-PCS | Mod: CPTII,S$GLB,, | Performed by: FAMILY MEDICINE

## 2022-03-18 PROCEDURE — 3078F PR MOST RECENT DIASTOLIC BLOOD PRESSURE < 80 MM HG: ICD-10-PCS | Mod: CPTII,S$GLB,, | Performed by: FAMILY MEDICINE

## 2022-03-18 PROCEDURE — 3008F BODY MASS INDEX DOCD: CPT | Mod: CPTII,S$GLB,, | Performed by: FAMILY MEDICINE

## 2022-03-18 PROCEDURE — 3008F PR BODY MASS INDEX (BMI) DOCUMENTED: ICD-10-PCS | Mod: CPTII,S$GLB,, | Performed by: FAMILY MEDICINE

## 2022-03-18 PROCEDURE — 3074F SYST BP LT 130 MM HG: CPT | Mod: CPTII,S$GLB,, | Performed by: FAMILY MEDICINE

## 2022-03-18 PROCEDURE — 1159F MED LIST DOCD IN RCRD: CPT | Mod: CPTII,S$GLB,, | Performed by: FAMILY MEDICINE

## 2022-03-18 RX ORDER — IPRATROPIUM BROMIDE 21 UG/1
2 SPRAY, METERED NASAL 3 TIMES DAILY
Qty: 30 ML | Refills: 0 | Status: SHIPPED | OUTPATIENT
Start: 2022-03-18 | End: 2024-01-23

## 2022-03-18 RX ORDER — PREDNISONE 10 MG/1
TABLET ORAL
Qty: 18 TABLET | Refills: 0 | Status: SHIPPED | OUTPATIENT
Start: 2022-03-18 | End: 2022-05-12

## 2022-03-18 NOTE — PROGRESS NOTES
" Patient ID: Cameron Gaona is a 31 y.o. male.    Chief Complaint: Establish Care and heavy breathing    HPI       Cameron Gaona is a 31 y.o. male here because of heavy breathing.  He would around him state that he breathes very heavy.  Patient with lot of sinus congestion and postnasal drip.  No specific itchy watery eyes or sneezing.  Does not complain of shortness of breath chest pain or palpitations.  Patient does state that he is difficulty staying awake throughout the day.  Fatigue when he wakes up.  Excessive snoring.  No witnessed apnea.  Even his son makes one of his snoring       Review of Symptoms  Other review of systems negative      Physical Exam    Vitals:    03/18/22 1051   BP: 120/78   BP Location: Left arm   Patient Position: Sitting   Pulse: 80   Temp: 98.3 °F (36.8 °C)   TempSrc: Oral   SpO2: 97%   Weight: 98.5 kg (217 lb 4.2 oz)   Height: 5' 5" (1.651 m)        Constitutional:   Oriented to person, place, and time.appears well-developed and well-nourished.   No distress.     HENT:   Head: Normocephalic and atraumatic.     Right Ear: Tympanic membrane, external ear and ear canal normal     Left Ear: Tympanic membrane, external ear and ear canal normal    Nose: External Normal. Normal turbinates, No rhinorrhea     Mouth/Throat: Uvula is midline, oropharynx is clear and moist and mucous membranes are normal.     Neck: supple no anterior cervical adenopathy    Carotid artery:  Bruit    NEG []   POS[]    Eyes:   Conjunctivae are normal. Right eye exhibits no discharge. Left eye exhibits no discharge. No scleral icterus. No periorbital edema     Cardiovascular:  Regular rate and rhythm with normal S1 and S2     Pulmonary/Chest:   Clear to auscultation bilaterally without wheezes, rhonchi or rales    Musculoskeletal:  No edema. No obvious deformity No wasting     Neurological:   Alert and oriented to person, place, and time. Coordination normal.     Skin:   Skin is warm and dry.   No " diaphoresis.   No rash noted.    Psychiatric: Normal mood and affect. Behavior is normal. Judgment and thought content normal.       Assessment / Plan:      ICD-10-CM ICD-9-CM   1. Daytime somnolence  R40.0 780.54   2. Snoring  R06.83 786.09   3. Heavy breathing  R06.89 786.09   4. Fatigue, unspecified type  R53.83 780.79   5. Poor sleep pattern  G47.8 780.59   6. Allergic rhinitis due to pollen, unspecified seasonality  J30.1 477.0   7. Hypertrophy of both inferior nasal turbinates  J34.3 478.0     Daytime somnolence  -     Home Sleep Studies; Future    Snoring  -     Home Sleep Studies; Future    Heavy breathing  -     Home Sleep Studies; Future    Fatigue, unspecified type  -     Home Sleep Studies; Future    Poor sleep pattern  -     Home Sleep Studies; Future    Allergic rhinitis due to pollen, unspecified seasonality    Hypertrophy of both inferior nasal turbinates    Other orders  -     ipratropium (ATROVENT) 21 mcg (0.03 %) nasal spray; 2 sprays by Nasal route 3 (three) times daily.  Dispense: 30 mL; Refill: 0  -     predniSONE (DELTASONE) 10 MG tablet; 3 tablets by mouth for 3 days then 2 tablets by mouth for 3 days then 1 tablet by mouth for the 3 days.  Dispense: 18 tablet; Refill: 0      Claritin/loratadine 10 milligrams-please get over-the-counter Claritin 10 milligrams and take daily for the foreseeable future.  [facility] has 400 tablets for around 10 dollars.    Flonase or fluticasone-get generic fluticasone at [facility] six bottles blood 28 dollars.  1 spray each nostril daily.    Sent in a prescription for prednisone or steroids-take as directed on the bottle    Lastly, sent in a nasal spray called Atrovent/ipratropium bromide-one spray each nostril at least twice a day may take it up to 3 times a day.      Sent in an order for home sleep study suspicious for sleep apnea.    Follow-up in two months

## 2022-03-18 NOTE — PATIENT INSTRUCTIONS
Claritin/loratadine 10 milligrams-please get over-the-counter Claritin 10 milligrams and take daily for the foreseeable future.  [facility] has 400 tablets for around 10 dollars.    Flonase or fluticasone-get generic fluticasone at [facility] six bottles blood 28 dollars.  1 spray each nostril daily.    Sent in a prescription for prednisone or steroids-take as directed on the bottle    Lastly, sent in a nasal spray called Atrovent/ipratropium bromide-one spray each nostril at least twice a day may take it up to 3 times a day.      Sent in an order for home sleep study suspicious for sleep apnea.    Follow-up in two months

## 2022-03-24 ENCOUNTER — CLINICAL SUPPORT (OUTPATIENT)
Dept: REHABILITATION | Facility: HOSPITAL | Age: 32
End: 2022-03-24
Attending: PODIATRIST
Payer: COMMERCIAL

## 2022-03-24 DIAGNOSIS — M25.571 CHRONIC PAIN OF RIGHT ANKLE: Primary | ICD-10-CM

## 2022-03-24 DIAGNOSIS — G89.29 CHRONIC PAIN OF RIGHT ANKLE: Primary | ICD-10-CM

## 2022-03-24 DIAGNOSIS — M25.671 DECREASED RANGE OF MOTION OF RIGHT ANKLE: ICD-10-CM

## 2022-03-24 PROCEDURE — 97110 THERAPEUTIC EXERCISES: CPT | Mod: PO

## 2022-03-24 NOTE — PROGRESS NOTES
"[facility]   Physical Therapy Treatment Note     Name: Cameron Gaona  Clinic Number: [id number]    Therapy Diagnosis:   Encounter Diagnoses   Name Primary?    Chronic pain of right ankle Yes    Decreased range of motion of right ankle      Physician: Cj Munoz DPM    Visit Date: 3/24/2022    Physician Orders: PT Eval and Treat   Medical Diagnosis from Referral:   M76.821 (ICD-10-CM) - Posterior tibial tendinitis, right   M76.71 (ICD-10-CM) - Peroneal tendonitis, right   M25.571,G89.29 (ICD-10-CM) - Chronic pain of right ankle         Evaluation Date: 3/3/2022  Authorization Period Expiration: 04/01/2022  Plan of Care Expiration: 5/3/2022  Progress Note Due: 4/3/2022  Visit # / Visits authorized: 3/ 20   FOTO: 3/5     Precautions: Standard         PTA Visit #: 1/5     Time In: 5:00 pm  Time Out: 5:45 pm  Total Billable Time: 45 minutes    SUBJECTIVE     Pt reports: he feels really good today, and he does not have any pain. Pt states that he has not been wearing his brace but has not been really having pain. He states that he thinks the boots have been causing his pain, and he plans to go purchase some new boots tomorrow.  He was compliant with home exercise program.  Response to previous treatment: he was sore for a couple hours afterwards and then felt good  Functional change: none    Pain: 0/10  Location: right ankles     OBJECTIVE     Objective Measures updated at progress report unless specified.     Treatment     Cameron received the treatments listed below:      therapeutic exercises to develop ROM for 40 minutes including:     Date 3/24/2022 3/17/2022 3/15/2022 3/3/2022   Visit 3/20 2/20 1/20 1/1   POC exp 5/3/2022 5/3/2022 5/3/2022 5/3/2022   PN 4/3/2022 4/3/2022 4/3/2022 4/3/2022   FTF 4/3/2022 4/3/2022 4/3/2022 4/3/2022   FOTO 3/5 2/5 1/5 0/5                              bike 5' at L6 5' at L6 5 min L3      Mat:        gastroc stretch 3 x 30" 3 x 30" 3 x 30"    " "  Soleus str  30" (switch to standing)     Ankle tband   4 directions 20x GTB 20x GTB 30x GTB plantar/dors  30x  RTB  Inv/ever     BAPS (seated)  (Standing)  30x ea (fwd/bwd; side to side)  20x   L1   Fwd/bwd  Side to side   ccw/cw     Towel crunch   20x      Towel inv/ev        Ankle AROM        Ankle ABCs 2x 1x     Standing:        gastroc/soleus str 3 x 30" ea 3 x 30" ea     squats       Heel raise 30x       BAPS        SLS  4 x 15" on foam      Seen by EG EG EM EG      manual therapy techniques: Myofacial release were applied to the: R ankle for 5 minutes, including:  STIM to peroneal   - IASTM and STM to peroneal tendons and posterior tib tendon          PATIENT EDUCATION AND HOME EXERCISES      Education provided:   - importance of consistent performance of HEP  - role of PT/PTA     Written Home Exercises Provided: yes. Exercises were reviewed and Cameron was able to demonstrate them prior to the end of the session.  Cameron demonstrated good  understanding of the education provided. See EMR under Patient Instructions for exercises provided during therapy sessions.    ASSESSMENT     Camreon presents to therapy stating that he is not currently having any pain in his ankle and he is feeling great. He requires minimal VCs and TCs for appropriate body mechanics and for postural awareness in order to avoid compensatory movements. He tolerates prescribed activities as well as MT states his ankle feels better at completion of session.    Patient arrived to session with mild complaints of R ankle pain, especially with ankle inversion. Patient felt relief after manual and exercises caused no increases in pain. Patient ended session stating he felt better than when he arrived.     Cameron Is progressing well towards his goals.   Pt prognosis is Good.     Pt will continue to benefit from skilled outpatient physical therapy to address the deficits listed in the problem list box on initial evaluation, provide pt/family " education and to maximize pt's level of independence in the home and community environment.     Pt's spiritual, cultural and educational needs considered and pt agreeable to plan of care and goals.     Anticipated barriers to physical therapy: none    Goals:  Short Term Goals: 4 weeks   1. This patient will be independent with a basic HEP. In progress, not met  2. This patient will have a pain rating of 5/10 at worst with ADLs. In progress, not met  3. Patient able to score greater than or equal to 75% on the FOTO Ankle Survey indicating patient is 25% impaired, limited, or restricted and demonstrating overall decreased ankle pain with functional activities. In progress, not met     Long Term Goals 8 weeks   1. This patient will be independent with an updated HEP. In progress, not met  2. This patient will increase B LE strength to 5/5 in order to be able to perform his job responsibilities with no complaints of pain. In progress, not met  3. This patient will have a pain rating of 2/10 at worst with ADLs. In progress, not met  4. Patient able to score greater than or equal to 85% on the FOTO Ankle Survey indicating patient is 15% impaired, limited, or restricted and demonstrating overall decreased ankle pain with functional activities. In progress, not met    PLAN     Continue to increase performance of therex and neuromuscular re-education activities. Perform SLS on BOSU.    Kim Weber, PT

## 2022-03-29 ENCOUNTER — TELEPHONE (OUTPATIENT)
Dept: SLEEP MEDICINE | Facility: OTHER | Age: 32
End: 2022-03-29
Payer: COMMERCIAL

## 2022-03-29 ENCOUNTER — CLINICAL SUPPORT (OUTPATIENT)
Dept: REHABILITATION | Facility: HOSPITAL | Age: 32
End: 2022-03-29
Attending: PODIATRIST
Payer: COMMERCIAL

## 2022-03-29 DIAGNOSIS — G89.29 CHRONIC PAIN OF RIGHT ANKLE: Primary | ICD-10-CM

## 2022-03-29 DIAGNOSIS — M25.671 DECREASED RANGE OF MOTION OF RIGHT ANKLE: ICD-10-CM

## 2022-03-29 DIAGNOSIS — M25.571 CHRONIC PAIN OF RIGHT ANKLE: Primary | ICD-10-CM

## 2022-03-29 PROCEDURE — 97110 THERAPEUTIC EXERCISES: CPT | Mod: PO

## 2022-03-29 NOTE — PROGRESS NOTES
"[facility]   Physical Therapy Treatment Note     Name: Cameron Gaona  Clinic Number: [id number]    Therapy Diagnosis:   Encounter Diagnoses   Name Primary?    Chronic pain of right ankle Yes    Decreased range of motion of right ankle      Physician: jC Munoz DPM    Visit Date: 3/29/2022    Physician Orders: PT Eval and Treat   Medical Diagnosis from Referral:   M76.821 (ICD-10-CM) - Posterior tibial tendinitis, right   M76.71 (ICD-10-CM) - Peroneal tendonitis, right   M25.571,G89.29 (ICD-10-CM) - Chronic pain of right ankle         Evaluation Date: 3/3/2022  Authorization Period Expiration: 04/01/2022  Plan of Care Expiration: 5/3/2022  Progress Note Due: 4/3/2022  Visit # / Visits authorized: 4/ 20   FOTO: 4/5     Precautions: Standard         PTA Visit #: 1/5     Time In: 5:00 pm  Time Out: 5:45 pm  Total Billable Time: 45 minutes    SUBJECTIVE     Pt reports: he feels some pain today because he was on his feet all day. He states that he has not bought the new work boots yet. He states he was not wearing boots today.  He was compliant with home exercise program.  Response to previous treatment: felt good until he went home  Functional change: none    Pain: 5/10  Location: right ankles     OBJECTIVE     Objective Measures updated at progress report unless specified.     Treatment     Cameron received the treatments listed below:      therapeutic exercises to develop ROM for 45 minutes including:     Date 3/29/2022 3/24/2022 3/17/2022 3/15/2022 3/3/2022   Visit 4/20 3/20 2/20 1/20 1/1   POC exp 5/3/2022 5/3/2022 5/3/2022 5/3/2022 5/3/2022   PN 4/3/2022 4/3/2022 4/3/2022 4/3/2022 4/3/2022   FTF 4/3/2022 4/3/2022 4/3/2022 4/3/2022 4/3/2022   FOTO 4/5 3/5 2/5 1/5 0/5                                 bike 5' at L6 5' at L6 5' at L6 5 min L3      Mat:         gastroc stretch 3 x 30" (standing) 3 x 30" 3 x 30" 3 x 30"      Soleus str 3 x 30" (standing)  30" (switch to standing)   " "  Ankle tband   4 directions 2 x 10 GTB 20x GTB 20x GTB 30x GTB plantar/dors  30x  RTB  Inv/ever     BAPS (seated) (Standing) 30x ea (fwd/bwd; side to side)  (Standing)  30x ea (fwd/bwd; side to side)  20x   L1   Fwd/bwd  Side to side   ccw/cw     Towel crunch    20x      Towel inv/ev         Ankle AROM         Ankle ABCs 1x 2x 1x     Standing:         gastroc/soleus str  3 x 30" ea 3 x 30" ea     squats        Heel raise 30x  30x       BAPS         SLS   4 x 15" on foam      Seen by EG EG EG EM EG      manual therapy techniques: Myofacial release were applied to the: R ankle for 0 minutes, including:  STIM to peroneal   - IASTM and STM to peroneal tendons and posterior tib tendon          PATIENT EDUCATION AND HOME EXERCISES      Education provided:   - importance of consistent performance of HEP  - role of PT/PTA     Written Home Exercises Provided: yes. Exercises were reviewed and Cameron was able to demonstrate them prior to the end of the session.  Cameron demonstrated good  understanding of the education provided. See EMR under Patient Instructions for exercises provided during therapy sessions.    ASSESSMENT     Cameron presents to therapy stating that he is having increased pain in his R ankle and foot, probably because he has been on his feet all day today. He states that he had some pain following last session that he did not experience until he got home. MT not performed today in order to determine if it caused pain provocation following last session. He requires minimal VCs and TCs for appropriate body mechanics and for postural awareness in order to avoid compensatory movements. He tolerates prescribed activities well at this session with minimal pain provocation.       Cameron Is progressing well towards his goals.   Pt prognosis is Good.     Pt will continue to benefit from skilled outpatient physical therapy to address the deficits listed in the problem list box on initial evaluation, provide pt/family " education and to maximize pt's level of independence in the home and community environment.     Pt's spiritual, cultural and educational needs considered and pt agreeable to plan of care and goals.     Anticipated barriers to physical therapy: none    Goals:  Short Term Goals: 4 weeks   1. This patient will be independent with a basic HEP. In progress, not met  2. This patient will have a pain rating of 5/10 at worst with ADLs. In progress, not met  3. Patient able to score greater than or equal to 75% on the FOTO Ankle Survey indicating patient is 25% impaired, limited, or restricted and demonstrating overall decreased ankle pain with functional activities. In progress, not met     Long Term Goals 8 weeks   1. This patient will be independent with an updated HEP. In progress, not met  2. This patient will increase B LE strength to 5/5 in order to be able to perform his job responsibilities with no complaints of pain. In progress, not met  3. This patient will have a pain rating of 2/10 at worst with ADLs. In progress, not met  4. Patient able to score greater than or equal to 85% on the FOTO Ankle Survey indicating patient is 15% impaired, limited, or restricted and demonstrating overall decreased ankle pain with functional activities. In progress, not met    PLAN     Continue to increase performance of therex and neuromuscular re-education activities. Perform SLS on BOSU.    Kim Weber, PT

## 2022-03-31 ENCOUNTER — OFFICE VISIT (OUTPATIENT)
Dept: PODIATRY | Facility: CLINIC | Age: 32
End: 2022-03-31
Payer: COMMERCIAL

## 2022-03-31 VITALS
BODY MASS INDEX: 36.15 KG/M2 | SYSTOLIC BLOOD PRESSURE: 145 MMHG | DIASTOLIC BLOOD PRESSURE: 82 MMHG | HEART RATE: 95 BPM | WEIGHT: 217 LBS | HEIGHT: 65 IN

## 2022-03-31 DIAGNOSIS — M25.571 CHRONIC PAIN OF RIGHT ANKLE: ICD-10-CM

## 2022-03-31 DIAGNOSIS — M76.821 POSTERIOR TIBIAL TENDINITIS, RIGHT: Primary | ICD-10-CM

## 2022-03-31 DIAGNOSIS — G89.29 CHRONIC PAIN OF RIGHT ANKLE: ICD-10-CM

## 2022-03-31 DIAGNOSIS — M76.71 PERONEAL TENDONITIS, RIGHT: ICD-10-CM

## 2022-03-31 PROCEDURE — 99999 PR PBB SHADOW E&M-EST. PATIENT-LVL III: ICD-10-PCS | Mod: PBBFAC,,, | Performed by: PODIATRIST

## 2022-03-31 PROCEDURE — 1159F MED LIST DOCD IN RCRD: CPT | Mod: CPTII,S$GLB,, | Performed by: PODIATRIST

## 2022-03-31 PROCEDURE — 3008F PR BODY MASS INDEX (BMI) DOCUMENTED: ICD-10-PCS | Mod: CPTII,S$GLB,, | Performed by: PODIATRIST

## 2022-03-31 PROCEDURE — 1159F PR MEDICATION LIST DOCUMENTED IN MEDICAL RECORD: ICD-10-PCS | Mod: CPTII,S$GLB,, | Performed by: PODIATRIST

## 2022-03-31 PROCEDURE — 1160F RVW MEDS BY RX/DR IN RCRD: CPT | Mod: CPTII,S$GLB,, | Performed by: PODIATRIST

## 2022-03-31 PROCEDURE — 99213 PR OFFICE/OUTPT VISIT, EST, LEVL III, 20-29 MIN: ICD-10-PCS | Mod: S$GLB,,, | Performed by: PODIATRIST

## 2022-03-31 PROCEDURE — 3077F SYST BP >= 140 MM HG: CPT | Mod: CPTII,S$GLB,, | Performed by: PODIATRIST

## 2022-03-31 PROCEDURE — 99213 OFFICE O/P EST LOW 20 MIN: CPT | Mod: S$GLB,,, | Performed by: PODIATRIST

## 2022-03-31 PROCEDURE — 3008F BODY MASS INDEX DOCD: CPT | Mod: CPTII,S$GLB,, | Performed by: PODIATRIST

## 2022-03-31 PROCEDURE — 1160F PR REVIEW ALL MEDS BY PRESCRIBER/CLIN PHARMACIST DOCUMENTED: ICD-10-PCS | Mod: CPTII,S$GLB,, | Performed by: PODIATRIST

## 2022-03-31 PROCEDURE — 3079F PR MOST RECENT DIASTOLIC BLOOD PRESSURE 80-89 MM HG: ICD-10-PCS | Mod: CPTII,S$GLB,, | Performed by: PODIATRIST

## 2022-03-31 PROCEDURE — 99999 PR PBB SHADOW E&M-EST. PATIENT-LVL III: CPT | Mod: PBBFAC,,, | Performed by: PODIATRIST

## 2022-03-31 PROCEDURE — 3079F DIAST BP 80-89 MM HG: CPT | Mod: CPTII,S$GLB,, | Performed by: PODIATRIST

## 2022-03-31 PROCEDURE — 3077F PR MOST RECENT SYSTOLIC BLOOD PRESSURE >= 140 MM HG: ICD-10-PCS | Mod: CPTII,S$GLB,, | Performed by: PODIATRIST

## 2022-03-31 RX ORDER — MELOXICAM 15 MG/1
15 TABLET ORAL DAILY
Qty: 30 TABLET | Refills: 1 | Status: SHIPPED | OUTPATIENT
Start: 2022-03-31 | End: 2024-01-23

## 2022-03-31 NOTE — PROGRESS NOTES
"Subjective:      Patient ID: Cameron Gaona is a 31 y.o. male.    Chief Complaint: No chief complaint on file.    Patient presents to clinic with chief complaint of bilateral bottom heel pain, right worse than left. Symptoms have been ongoing for several months. He reports pain is worsened with the first couple of steps in the morning and with the first couple of steps after prolonged sitting and symptoms improve with increased walking. Describes pain as throbbing, aching in nature.  He denies any recent trauma to the feet. Denies any prior treatments for the heel pain. He works as a  and wears boots throughout the day, otherwise tennis shoes. He does report to increased barefoot walking when at home.     02/17/2022:  Relates that previous plantar heel pain has resolved.  He has developed pain along the medial aspect of the right ankle which has been present for the past several months and most likely was overshadowed by the heel pain at the last visit.  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  Pain alleviated with rest however it is sometimes present rest.  No longer taking meloxicam since he did not notice a big difference.  Walking with tennis shoes.  He did not purchase new work boots as discussed.  He is using over-the-counter ankle brace which helps somewhat to the right ankle.    03/31/2022:  Follow-up for posterior tibial tendinitis and peroneal tendinitis to the right ankle.  He has been wearing right ankle brace as ordered and modifying his duties.  He has been on medical leave for work.  Attending physical therapy is ordered which seems to be working somewhat.  His pain will wax and wane depending on his activity.    Vitals:    03/31/22 1057   BP: (!) 145/82   Pulse: 95   Weight: 98.4 kg (217 lb)   Height: 5' 5" (1.651 m)   PainSc:   6   PainLoc: Foot      History reviewed. No " pertinent past medical history.    History reviewed. No pertinent surgical history.    Family History   Problem Relation Age of Onset    No Known Problems Mother     No Known Problems Father        Social History     Socioeconomic History    Marital status: Single   Tobacco Use    Smoking status: Former Smoker    Smokeless tobacco: Former User   Substance and Sexual Activity    Alcohol use: Yes    Drug use: No       Current Outpatient Medications   Medication Sig Dispense Refill    ipratropium (ATROVENT) 21 mcg (0.03 %) nasal spray 2 sprays by Nasal route 3 (three) times daily. 30 mL 0    predniSONE (DELTASONE) 10 MG tablet 3 tablets by mouth for 3 days then 2 tablets by mouth for 3 days then 1 tablet by mouth for the 3 days. 18 tablet 0    meloxicam (MOBIC) 15 MG tablet Take 1 tablet (15 mg total) by mouth once daily. 30 tablet 1     No current facility-administered medications for this visit.       Review of patient's allergies indicates:   Allergen Reactions    Shrimp Swelling     Throat swelling           Review of Systems   Constitutional: Negative for chills, fever and malaise/fatigue.   HENT: Negative for hearing loss.    Cardiovascular: Negative for claudication.   Respiratory: Negative for cough, shortness of breath and wheezing.    Hematologic/Lymphatic: Does not bruise/bleed easily.   Skin: Negative for flushing, nail changes, rash and suspicious lesions.   Musculoskeletal: Positive for myalgias. Negative for back pain and joint pain.   Gastrointestinal: Negative for nausea and vomiting.   Neurological: Negative for loss of balance, numbness, paresthesias and sensory change.   Psychiatric/Behavioral: Negative for altered mental status.           Objective:      Physical Exam  Constitutional:       General: He is not in acute distress.     Appearance: He is obese. He is not ill-appearing.   Cardiovascular:      Pulses:           Dorsalis pedis pulses are 2+ on the right side and 2+ on the left  side.        Posterior tibial pulses are 2+ on the right side and 2+ on the left side.      Comments: CFT< 3 secs all toes bilateral foot, skin temp warm to cool bilateral foot,  hair growth present to bilateral lower extremity, no edema to bilateral lower extremities    Musculoskeletal:      Comments: Flexible pes planovalgus foot type bilateral.  Mild pain on palpation along the course of the posterior tibial tendon however is mostly localized over the distal 1/3 on to the navicular tuberosity attachment and plantar to the navicular.  Mild to moderate pain with active resisted inversion of the right foot.  No pain on palpation along the course of peroneal tendons today or with active resisted eversion the neutral plantar flexed position right foot.  No pain with range of motion to the right foot ankle.  No pain with stress eversion or inversion right ankle.  Negative anterior drawer sign.    Note -15 degrees of dorsiflexion with the knee extended bilateral that reduces to near 0° with the knee flexed.             Skin:     General: Skin is warm.      Capillary Refill: Capillary refill takes less than 2 seconds.      Findings: No ecchymosis or erythema.      Nails: There is no clubbing.      Comments: Skin warm, dry, intact to bilateral feet. No open wounds, no discoloration, no signs of infection   Neurological:      Mental Status: He is alert and oriented to person, place, and time.      Sensory: Sensation is intact.      Motor: Motor function is intact.      Comments: Negative tinel sign to percussion bilateral.                Assessment:       Encounter Diagnoses   Name Primary?    Posterior tibial tendinitis, right Yes    Peroneal tendonitis, right     Chronic pain of right ankle          Plan:       Diagnoses and all orders for this visit:    Posterior tibial tendinitis, right  -     MRI Ankle Without Contrast Right; Future    Peroneal tendonitis, right  -     MRI Ankle Without Contrast Right;  Future    Chronic pain of right ankle    Other orders  -     meloxicam (MOBIC) 15 MG tablet; Take 1 tablet (15 mg total) by mouth once daily.      I counseled the patient on his conditions, their implications and medical management.    Recommend continued aggressive bracing.  We did discuss dispensing a tall orthopedic boot however patient declined at this time.  We discussed appropriate types of shoes to wear such as motion control tennis shoes.  Rest, ice and elevation p.r.n..    Continue physical therapy as ordered.    Order MRI of the right ankle to ensure that the posterior tibial tendon is not torn and talar his treatment plan accordingly.    No dispensed in order to maintain his current medical restrictions.    RTC within 6 weeks or p.r.n. as discussed.    A portion of this note was generated by voice recognition software and may contain spelling and grammar errors.

## 2022-04-05 ENCOUNTER — PATIENT MESSAGE (OUTPATIENT)
Dept: ORTHOPEDICS | Facility: CLINIC | Age: 32
End: 2022-04-05
Payer: COMMERCIAL

## 2022-04-05 ENCOUNTER — TELEPHONE (OUTPATIENT)
Dept: SLEEP MEDICINE | Facility: OTHER | Age: 32
End: 2022-04-05
Payer: COMMERCIAL

## 2022-04-05 ENCOUNTER — DOCUMENTATION ONLY (OUTPATIENT)
Dept: REHABILITATION | Facility: HOSPITAL | Age: 32
End: 2022-04-05
Payer: COMMERCIAL

## 2022-04-05 ENCOUNTER — CLINICAL SUPPORT (OUTPATIENT)
Dept: REHABILITATION | Facility: HOSPITAL | Age: 32
End: 2022-04-05
Attending: PODIATRIST
Payer: COMMERCIAL

## 2022-04-05 DIAGNOSIS — M25.571 CHRONIC PAIN OF RIGHT ANKLE: Primary | ICD-10-CM

## 2022-04-05 DIAGNOSIS — M25.671 DECREASED RANGE OF MOTION OF RIGHT ANKLE: ICD-10-CM

## 2022-04-05 DIAGNOSIS — G89.29 CHRONIC PAIN OF RIGHT ANKLE: Primary | ICD-10-CM

## 2022-04-05 PROCEDURE — 97112 NEUROMUSCULAR REEDUCATION: CPT | Mod: PO

## 2022-04-05 PROCEDURE — 97110 THERAPEUTIC EXERCISES: CPT | Mod: PO

## 2022-04-05 NOTE — TELEPHONE ENCOUNTER
Patient did let me know he's not working at this time.  He will call us when he can schedule the home sleep study.

## 2022-04-05 NOTE — PROGRESS NOTES
"[facility]   Physical Therapy Treatment Note     Name: Cameron Gaona  Clinic Number: [id number]    Therapy Diagnosis:   Encounter Diagnoses   Name Primary?    Chronic pain of right ankle Yes    Decreased range of motion of right ankle      Physician: Cj Munoz DPM    Visit Date: 4/5/2022    Physician Orders: PT Eval and Treat   Medical Diagnosis from Referral:   M76.821 (ICD-10-CM) - Posterior tibial tendinitis, right   M76.71 (ICD-10-CM) - Peroneal tendonitis, right   M25.571,G89.29 (ICD-10-CM) - Chronic pain of right ankle         Evaluation Date: 3/3/2022  Authorization Period Expiration: 04/01/2022  Plan of Care Expiration: 5/3/2022  Progress Note Due: 4/3/2022  Visit # / Visits authorized: 4/ 20   FOTO: 4/5     Precautions: Standard         PTA Visit #: 1/5     Time In: 5:00 pm  Time Out: 5:43 pm  Total Billable Time: 43 minutes    SUBJECTIVE     Pt reports: he feels good today with no pain. He explains that he has been wearing his brace frequently after going to the doctor and his doctor explaining to him the importance of frequent wear.  He was compliant with home exercise program.  Response to previous treatment: good  Functional change: none    Pain: 0/10  Location: right ankles     OBJECTIVE     Objective Measures updated at progress report unless specified.     Treatment     Cameron received the treatments listed below:      therapeutic exercises to develop ROM for 43 minutes including:     Date 4/5/2022 3/29/2022 3/24/2022 3/17/2022 3/15/2022 3/3/2022   Visit 5/20 4/20 3/20 2/20 1/20 1/1   POC exp 5/3/2022 5/3/2022 5/3/2022 5/3/2022 5/3/2022 5/3/2022   PN 5/3/2022 4/3/2022 4/3/2022 4/3/2022 4/3/2022 4/3/2022   FTF 5/3/2022 4/3/2022 4/3/2022 4/3/2022 4/3/2022 4/3/2022   FOTO 5/5 4/5 3/5 2/5 1/5 0/5                                    bike 5' at L6 5' at L6 5' at L6 5' at L6 5 min L3      Mat:          gastroc stretch  3 x 30" (standing) 3 x 30" 3 x 30" 3 x 30"    " "  Soleus str  3 x 30" (standing)  30" (switch to standing)     Ankle tband   4 directions 3 x 10 GTB 2 x 10 GTB 20x GTB 20x GTB 30x GTB plantar/dors  30x  RTB  Inv/ever     BAPS (seated) (Standing) 30x ea (fwd/bwd; side to side; and ccw/cw) (Standing) 30x ea (fwd/bwd; side to side)  (Standing)  30x ea (fwd/bwd; side to side)  20x   L1   Fwd/bwd  Side to side   ccw/cw     Towel crunch     20x      Towel inv/ev          Ankle AROM          Ankle ABCs 2x 1x 2x 1x     Standing:          gastroc/soleus str 3 x 30" ea  3 x 30" ea 3 x 30" ea     squats 2 x 10        Heel raise 30x 30x  30x       BAPS          SLS 4 x 20" on foam   4 x 15" on foam      Seen by EG EG EG EG EM EG      manual therapy techniques: Myofacial release were applied to the: R ankle for 0 minutes, including:  STIM to peroneal   - IASTM and STM to peroneal tendons and posterior tib tendon          PATIENT EDUCATION AND HOME EXERCISES      Education provided:   - importance of consistent performance of HEP  - role of PT/PTA     Written Home Exercises Provided: yes. Exercises were reviewed and Cameron was able to demonstrate them prior to the end of the session.  Cameron demonstrated good  understanding of the education provided. See EMR under Patient Instructions for exercises provided during therapy sessions.    ASSESSMENT     Cameron presents to therapy stating that he is not having any pain in his R ankle, and that he has been wearing his brace all day today. He tolerates today's session well without pain provocation. He has increased challenge with ankle ABC's, BAPs board, and SL on foam activities. He requires minimal VCs and TCs for appropriate body mechanics and for postural awareness in order to avoid compensatory movements.        Cameron Is progressing well towards his goals.   Pt prognosis is Good.     Pt will continue to benefit from skilled outpatient physical therapy to address the deficits listed in the problem list box on initial evaluation, " provide pt/family education and to maximize pt's level of independence in the home and community environment.     Pt's spiritual, cultural and educational needs considered and pt agreeable to plan of care and goals.     Anticipated barriers to physical therapy: none    Goals:  Short Term Goals: 4 weeks   1. This patient will be independent with a basic HEP. In progress, not met  2. This patient will have a pain rating of 5/10 at worst with ADLs. In progress, not met  3. Patient able to score greater than or equal to 75% on the FOTO Ankle Survey indicating patient is 25% impaired, limited, or restricted and demonstrating overall decreased ankle pain with functional activities. In progress, not met     Long Term Goals 8 weeks   1. This patient will be independent with an updated HEP. In progress, not met  2. This patient will increase B LE strength to 5/5 in order to be able to perform his job responsibilities with no complaints of pain. In progress, not met  3. This patient will have a pain rating of 2/10 at worst with ADLs. In progress, not met  4. Patient able to score greater than or equal to 85% on the FOTO Ankle Survey indicating patient is 15% impaired, limited, or restricted and demonstrating overall decreased ankle pain with functional activities. In progress, not met    PLAN     Continue to increase performance of therex and neuromuscular re-education activities. Perform SLS on BOSU.    Kim Weber, PT

## 2022-04-05 NOTE — PROGRESS NOTES
PT/PTA met face to face to discuss pt's treatment plan and progress towards established goals. Pt will be seen by a physical therapist minimally every 6th visit or every 30 days.      Kim Weber PT    Face to Face PTA Conference performed with Kim Weber PT regarding patient's current status, overall progress, and plan of care. Pt will be seen by a physical therapist minimally every 6th visit or every 30 days.    Ezekiel Barnett, TANYA   4/5/2022

## 2022-04-11 ENCOUNTER — TELEPHONE (OUTPATIENT)
Dept: PODIATRY | Facility: CLINIC | Age: 32
End: 2022-04-11
Payer: COMMERCIAL

## 2022-04-11 ENCOUNTER — PATIENT MESSAGE (OUTPATIENT)
Dept: PODIATRY | Facility: CLINIC | Age: 32
End: 2022-04-11
Payer: COMMERCIAL

## 2022-04-11 NOTE — TELEPHONE ENCOUNTER
Pt's FMLA forms were scanned into chart under  and faxed to [facility] [phone]. Confirmation was received. Pt was notified.

## 2022-04-22 ENCOUNTER — HOSPITAL ENCOUNTER (OUTPATIENT)
Dept: RADIOLOGY | Facility: HOSPITAL | Age: 32
Discharge: HOME | End: 2022-04-22
Attending: PODIATRIST
Payer: COMMERCIAL

## 2022-04-22 ENCOUNTER — PATIENT MESSAGE (OUTPATIENT)
Dept: PODIATRY | Facility: HOSPITAL | Age: 32
End: 2022-04-22
Payer: COMMERCIAL

## 2022-04-22 ENCOUNTER — TELEPHONE (OUTPATIENT)
Dept: PODIATRY | Facility: CLINIC | Age: 32
End: 2022-04-22
Payer: COMMERCIAL

## 2022-04-22 ENCOUNTER — PATIENT MESSAGE (OUTPATIENT)
Dept: PODIATRY | Facility: CLINIC | Age: 32
End: 2022-04-22
Payer: COMMERCIAL

## 2022-04-22 DIAGNOSIS — M76.821 POSTERIOR TIBIAL TENDINITIS, RIGHT: ICD-10-CM

## 2022-04-22 DIAGNOSIS — M76.71 PERONEAL TENDONITIS, RIGHT: ICD-10-CM

## 2022-04-22 PROCEDURE — 73721 MRI JNT OF LWR EXTRE W/O DYE: CPT | Mod: TC,PO,RT

## 2022-04-22 NOTE — TELEPHONE ENCOUNTER
Spoke with Mr Gaona regarding return to work status. 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 Per Mr. Gaona he just does not feel comfortable being the primary drive of the  and would require a back up  due to working 12+ hours during shifts. Dr. Munoz did review Mr Gaona MRI today with results discussed with pt that the posterior tibial tendon is inflamed and thickened but not torn. Mr Gaona is aware he has a follow up visit slated with Dr. Munoz on 5/12/22. Will inform Dr. Munoz of Mr. Gaona concerns in regards to when he can safely return to work without restrictions. No other needs voiced per  Jimenez at this time.

## 2022-04-28 ENCOUNTER — TELEPHONE (OUTPATIENT)
Dept: PODIATRY | Facility: CLINIC | Age: 32
End: 2022-04-28
Payer: COMMERCIAL

## 2022-04-28 NOTE — TELEPHONE ENCOUNTER
Spoke with Mr Gaona to let him know his return to work forms are still being processed. As soon as the form is ready will let him know and fax forms/send copy to him over the portal. No other needs voiced.

## 2022-04-28 NOTE — TELEPHONE ENCOUNTER
----- Message from T[place]ontana Chevalier sent at 4/28/2022 11:54 AM CDT -----  Regarding: Pt Advice  Contact: pt @ [phone]  Pt calling again to get the status of his return to work forms. Please call.

## 2022-05-11 ENCOUNTER — PATIENT OUTREACH (OUTPATIENT)
Dept: ADMINISTRATIVE | Facility: OTHER | Age: 32
End: 2022-05-11
Payer: COMMERCIAL

## 2022-05-11 NOTE — PROGRESS NOTES
Health Maintenance Due   Topic Date Due    COVID-19 Vaccine (3 - Booster) 01/10/2022     Updates were requested from care everywhere.  Chart was reviewed for overdue Proactive [facility] Encounters ([facility]) topics (CRS, Breast Cancer Screening, Eye exam)  Health Maintenance has been updated.  [facility] immunization registry triggered.  Immunizations were reconciled.

## 2022-05-12 ENCOUNTER — OFFICE VISIT (OUTPATIENT)
Dept: PODIATRY | Facility: CLINIC | Age: 32
End: 2022-05-12
Payer: COMMERCIAL

## 2022-05-12 ENCOUNTER — HOSPITAL ENCOUNTER (OUTPATIENT)
Dept: RADIOLOGY | Facility: HOSPITAL | Age: 32
Discharge: HOME | End: 2022-05-12
Attending: PODIATRIST
Payer: COMMERCIAL

## 2022-05-12 VITALS
BODY MASS INDEX: 36.14 KG/M2 | HEART RATE: 83 BPM | SYSTOLIC BLOOD PRESSURE: 137 MMHG | DIASTOLIC BLOOD PRESSURE: 82 MMHG | HEIGHT: 65 IN | WEIGHT: 216.94 LBS

## 2022-05-12 DIAGNOSIS — M25.571 CHRONIC PAIN OF RIGHT ANKLE: ICD-10-CM

## 2022-05-12 DIAGNOSIS — M21.6X1 PRONATION OF BOTH FEET: ICD-10-CM

## 2022-05-12 DIAGNOSIS — G89.29 CHRONIC PAIN OF RIGHT ANKLE: ICD-10-CM

## 2022-05-12 DIAGNOSIS — M76.821 POSTERIOR TIBIAL TENDON DYSFUNCTION (PTTD) OF RIGHT LOWER EXTREMITY: ICD-10-CM

## 2022-05-12 DIAGNOSIS — M76.821 POSTERIOR TIBIAL TENDON DYSFUNCTION (PTTD) OF RIGHT LOWER EXTREMITY: Primary | ICD-10-CM

## 2022-05-12 DIAGNOSIS — M21.6X2 PRONATION OF BOTH FEET: ICD-10-CM

## 2022-05-12 DIAGNOSIS — M24.573 EQUINUS CONTRACTURE OF ANKLE: ICD-10-CM

## 2022-05-12 PROCEDURE — 1159F PR MEDICATION LIST DOCUMENTED IN MEDICAL RECORD: ICD-10-PCS | Mod: CPTII,S$GLB,, | Performed by: PODIATRIST

## 2022-05-12 PROCEDURE — 73630 X-RAY EXAM OF FOOT: CPT | Mod: 26,RT,, | Performed by: RADIOLOGY

## 2022-05-12 PROCEDURE — 3079F PR MOST RECENT DIASTOLIC BLOOD PRESSURE 80-89 MM HG: ICD-10-PCS | Mod: CPTII,S$GLB,, | Performed by: PODIATRIST

## 2022-05-12 PROCEDURE — 3008F BODY MASS INDEX DOCD: CPT | Mod: CPTII,S$GLB,, | Performed by: PODIATRIST

## 2022-05-12 PROCEDURE — 99213 OFFICE O/P EST LOW 20 MIN: CPT | Mod: S$GLB,,, | Performed by: PODIATRIST

## 2022-05-12 PROCEDURE — 73630 XR FOOT COMPLETE 3 VIEW RIGHT: ICD-10-PCS | Mod: 26,RT,, | Performed by: RADIOLOGY

## 2022-05-12 PROCEDURE — 3008F PR BODY MASS INDEX (BMI) DOCUMENTED: ICD-10-PCS | Mod: CPTII,S$GLB,, | Performed by: PODIATRIST

## 2022-05-12 PROCEDURE — 3079F DIAST BP 80-89 MM HG: CPT | Mod: CPTII,S$GLB,, | Performed by: PODIATRIST

## 2022-05-12 PROCEDURE — 3075F PR MOST RECENT SYSTOLIC BLOOD PRESS GE 130-139MM HG: ICD-10-PCS | Mod: CPTII,S$GLB,, | Performed by: PODIATRIST

## 2022-05-12 PROCEDURE — 1159F MED LIST DOCD IN RCRD: CPT | Mod: CPTII,S$GLB,, | Performed by: PODIATRIST

## 2022-05-12 PROCEDURE — 1160F PR REVIEW ALL MEDS BY PRESCRIBER/CLIN PHARMACIST DOCUMENTED: ICD-10-PCS | Mod: CPTII,S$GLB,, | Performed by: PODIATRIST

## 2022-05-12 PROCEDURE — 73630 X-RAY EXAM OF FOOT: CPT | Mod: TC,PN,RT

## 2022-05-12 PROCEDURE — 99999 PR PBB SHADOW E&M-EST. PATIENT-LVL IV: CPT | Mod: PBBFAC,,, | Performed by: PODIATRIST

## 2022-05-12 PROCEDURE — 99999 PR PBB SHADOW E&M-EST. PATIENT-LVL IV: ICD-10-PCS | Mod: PBBFAC,,, | Performed by: PODIATRIST

## 2022-05-12 PROCEDURE — 3075F SYST BP GE 130 - 139MM HG: CPT | Mod: CPTII,S$GLB,, | Performed by: PODIATRIST

## 2022-05-12 PROCEDURE — 1160F RVW MEDS BY RX/DR IN RCRD: CPT | Mod: CPTII,S$GLB,, | Performed by: PODIATRIST

## 2022-05-12 PROCEDURE — 99213 PR OFFICE/OUTPT VISIT, EST, LEVL III, 20-29 MIN: ICD-10-PCS | Mod: S$GLB,,, | Performed by: PODIATRIST

## 2022-05-12 PROCEDURE — 73610 X-RAY EXAM OF ANKLE: CPT | Mod: TC,PN,RT

## 2022-05-12 PROCEDURE — 73610 X-RAY EXAM OF ANKLE: CPT | Mod: 26,RT,, | Performed by: RADIOLOGY

## 2022-05-12 PROCEDURE — 73610 XR ANKLE COMPLETE 3 VIEW RIGHT: ICD-10-PCS | Mod: 26,RT,, | Performed by: RADIOLOGY

## 2022-05-12 NOTE — PROGRESS NOTES
Subjective:      Patient ID: Cameron Gaona is a 31 y.o. male.    Chief Complaint: Foot Pain    Patient presents to clinic with chief complaint of bilateral bottom heel pain, right worse than left. Symptoms have been ongoing for several months. He reports pain is worsened with the first couple of steps in the morning and with the first couple of steps after prolonged sitting and symptoms improve with increased walking. Describes pain as throbbing, aching in nature.  He denies any recent trauma to the feet. Denies any prior treatments for the heel pain. He works as a  and wears boots throughout the day, otherwise tennis shoes. He does report to increased barefoot walking when at home.     02/17/2022:  Relates that previous plantar heel pain has resolved.  He has developed pain along the medial aspect of the right ankle which has been present for the past several months and most likely was overshadowed by the heel pain at the last visit.  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  Pain alleviated with rest however it is sometimes present rest.  No longer taking meloxicam since he did not notice a big difference.  Walking with tennis shoes.  He did not purchase new work boots as discussed.  He is using over-the-counter ankle brace which helps somewhat to the right ankle.    03/31/2022:  Follow-up for posterior tibial tendinitis and peroneal tendinitis to the right ankle.  He has been wearing right ankle brace as ordered and modifying his duties.  He has been on medical leave for work.  Attending physical therapy is ordered which seems to be working somewhat.  His pain will wax and wane depending on his activity.    05/12/2022:  Patient has been out of work for approximately 3 months.  Last physical therapy appointment was on 04/05/2022 however he missed a subsequent appointment and was not  "rescheduled.  Relates that he has been wearing his ankle brace throughout the day which helps.  Certain activities will cause pain the flare up.  States that he was washing his cardia there day and had moderate pain pointing to the medial and lateral aspects of the right ankle.  Repetitive motions such as driving also aggravate his pain.    Vitals:    05/12/22 1041   BP: 137/82   Pulse: 83   Weight: 98.4 kg (216 lb 14.9 oz)   Height: 5' 5" (1.651 m)   PainSc:   6      History reviewed. No pertinent past medical history.    History reviewed. No pertinent surgical history.    Family History   Problem Relation Age of Onset    No Known Problems Mother     No Known Problems Father        Social History     Socioeconomic History    Marital status: Single   Tobacco Use    Smoking status: Former Smoker    Smokeless tobacco: Former User   Substance and Sexual Activity    Alcohol use: Yes    Drug use: No       Current Outpatient Medications   Medication Sig Dispense Refill    ipratropium (ATROVENT) 21 mcg (0.03 %) nasal spray 2 sprays by Nasal route 3 (three) times daily. 30 mL 0    meloxicam (MOBIC) 15 MG tablet Take 1 tablet (15 mg total) by mouth once daily. 30 tablet 1     No current facility-administered medications for this visit.       Review of patient's allergies indicates:   Allergen Reactions    Shrimp Swelling     Throat swelling           Review of Systems   Constitutional: Negative for chills, fever and malaise/fatigue.   HENT: Negative for hearing loss.    Cardiovascular: Negative for claudication.   Respiratory: Negative for cough, shortness of breath and wheezing.    Hematologic/Lymphatic: Does not bruise/bleed easily.   Skin: Negative for flushing, nail changes, rash and suspicious lesions.   Musculoskeletal: Positive for myalgias. Negative for back pain and joint pain.   Gastrointestinal: Negative for nausea and vomiting.   Neurological: Negative for loss of balance, numbness, paresthesias and " sensory change.   Psychiatric/Behavioral: Negative for altered mental status.           Objective:      Physical Exam  Constitutional:       General: He is not in acute distress.     Appearance: He is obese. He is not ill-appearing.   Cardiovascular:      Pulses:           Dorsalis pedis pulses are 2+ on the right side and 2+ on the left side.        Posterior tibial pulses are 2+ on the right side and 2+ on the left side.      Comments: CFT< 3 secs all toes bilateral foot, skin temp warm to cool bilateral foot,  hair growth present to bilateral lower extremity, no edema to bilateral lower extremities    Musculoskeletal:      Comments: Flexible pes planovalgus foot type bilateral.  Pain on palpation overlying the posterior tibial tendon at the distal 1/3 on to the navicular tuberosity.  Mild pain with active resisted inversion of the right foot.  No pain on palpation along the course of peroneal tendons today or with active resisted eversion the neutral plantar flexed position right foot.  No pain with range of motion to the right foot ankle.  No pain with stress eversion or inversion right ankle.  Negative anterior drawer sign.    Note -15 degrees of dorsiflexion with the knee extended bilateral that reduces to near 0° with the knee flexed.             Skin:     General: Skin is warm.      Capillary Refill: Capillary refill takes less than 2 seconds.      Findings: No ecchymosis or erythema.      Nails: There is no clubbing.      Comments: Skin warm, dry, intact to bilateral feet. No open wounds, no discoloration, no signs of infection   Neurological:      Mental Status: He is alert and oriented to person, place, and time.      Sensory: Sensation is intact.      Motor: Motor function is intact.      Comments: Negative tinel sign to percussion bilateral.                Assessment:       Encounter Diagnoses   Name Primary?    Posterior tibial tendon dysfunction (PTTD) of right lower extremity Yes    Chronic pain of  right ankle     Pronation of both feet     Equinus contracture of ankle          Plan:       Cameron was seen today for foot pain.    Diagnoses and all orders for this visit:    Posterior tibial tendon dysfunction (PTTD) of right lower extremity  -     X-Ray Foot Complete Right; Future  -     X-Ray Ankle Complete Right; Future  -     ORTHOTIC DEVICE (DME)    Chronic pain of right ankle  -     X-Ray Foot Complete Right; Future  -     X-Ray Ankle Complete Right; Future  -     ORTHOTIC DEVICE (DME)    Pronation of both feet  -     ORTHOTIC DEVICE (DME)    Equinus contracture of ankle  -     ORTHOTIC DEVICE (DME)      I counseled the patient on his conditions, their implications and medical management.    We discussed that the patient may return to work however he should be employed in a different capacity and avoid any repetitive motion with his right foot such as driving.  I also recommended he avoid climbing up ladders or any significant stooping or squatting.  We had discussed surgical intervention for the posterior tibial tendon dysfunction to the right foot ankle which most likely would require reconstruction of the foot alignment and possible tendon transfer however patient declined at this time.  If the condition worsens over time we would revisit this option.  I have given a prescription for custom-molded orthotics molded to neutral to help support his foot in a better capacity in addition to good supportive shoe gear.    Patient declined further referral to physical therapy and states that he can perform the exercises on his own.    Baseline weight-bearing right foot ankle x-ray ordered.    RTC within 3 months or p.r.n. as discussed.    A portion of this note was generated by voice recognition software and may contain spelling and grammar errors.

## 2022-06-13 ENCOUNTER — PATIENT MESSAGE (OUTPATIENT)
Dept: PODIATRY | Facility: CLINIC | Age: 32
End: 2022-06-13
Payer: COMMERCIAL

## 2022-06-17 ENCOUNTER — TELEPHONE (OUTPATIENT)
Dept: PODIATRY | Facility: CLINIC | Age: 32
End: 2022-06-17
Payer: COMMERCIAL

## 2022-06-17 ENCOUNTER — PATIENT MESSAGE (OUTPATIENT)
Dept: PODIATRY | Facility: CLINIC | Age: 32
End: 2022-06-17
Payer: COMMERCIAL

## 2022-06-17 NOTE — TELEPHONE ENCOUNTER
Called to let Mr Gaona know that I faxed the completed doctors statement of employee work status to [employer] and that I also sent the form to him as an attachment through the pt portal. Encouraged him to call if further assistance is needed.

## 2022-07-26 ENCOUNTER — PATIENT MESSAGE (OUTPATIENT)
Dept: PODIATRY | Facility: CLINIC | Age: 32
End: 2022-07-26
Payer: COMMERCIAL

## 2022-08-12 ENCOUNTER — OFFICE VISIT (OUTPATIENT)
Dept: PODIATRY | Facility: CLINIC | Age: 32
End: 2022-08-12
Payer: COMMERCIAL

## 2022-08-12 VITALS
HEIGHT: 65 IN | HEART RATE: 72 BPM | SYSTOLIC BLOOD PRESSURE: 144 MMHG | DIASTOLIC BLOOD PRESSURE: 88 MMHG | BODY MASS INDEX: 36.1 KG/M2

## 2022-08-12 DIAGNOSIS — M24.573 EQUINUS CONTRACTURE OF ANKLE: ICD-10-CM

## 2022-08-12 DIAGNOSIS — M21.6X1 PRONATION DEFORMITY OF BOTH FEET: ICD-10-CM

## 2022-08-12 DIAGNOSIS — M21.6X2 PRONATION DEFORMITY OF BOTH FEET: ICD-10-CM

## 2022-08-12 DIAGNOSIS — M76.821 POSTERIOR TIBIAL TENDON DYSFUNCTION (PTTD) OF RIGHT LOWER EXTREMITY: Primary | ICD-10-CM

## 2022-08-12 PROCEDURE — 3077F PR MOST RECENT SYSTOLIC BLOOD PRESSURE >= 140 MM HG: ICD-10-PCS | Mod: CPTII,S$GLB,, | Performed by: PODIATRIST

## 2022-08-12 PROCEDURE — 99999 PR PBB SHADOW E&M-EST. PATIENT-LVL III: CPT | Mod: PBBFAC,,, | Performed by: PODIATRIST

## 2022-08-12 PROCEDURE — 1159F MED LIST DOCD IN RCRD: CPT | Mod: CPTII,S$GLB,, | Performed by: PODIATRIST

## 2022-08-12 PROCEDURE — 1160F RVW MEDS BY RX/DR IN RCRD: CPT | Mod: CPTII,S$GLB,, | Performed by: PODIATRIST

## 2022-08-12 PROCEDURE — 3008F BODY MASS INDEX DOCD: CPT | Mod: CPTII,S$GLB,, | Performed by: PODIATRIST

## 2022-08-12 PROCEDURE — 1159F PR MEDICATION LIST DOCUMENTED IN MEDICAL RECORD: ICD-10-PCS | Mod: CPTII,S$GLB,, | Performed by: PODIATRIST

## 2022-08-12 PROCEDURE — 99213 PR OFFICE/OUTPT VISIT, EST, LEVL III, 20-29 MIN: ICD-10-PCS | Mod: S$GLB,,, | Performed by: PODIATRIST

## 2022-08-12 PROCEDURE — 1160F PR REVIEW ALL MEDS BY PRESCRIBER/CLIN PHARMACIST DOCUMENTED: ICD-10-PCS | Mod: CPTII,S$GLB,, | Performed by: PODIATRIST

## 2022-08-12 PROCEDURE — 3077F SYST BP >= 140 MM HG: CPT | Mod: CPTII,S$GLB,, | Performed by: PODIATRIST

## 2022-08-12 PROCEDURE — 99213 OFFICE O/P EST LOW 20 MIN: CPT | Mod: S$GLB,,, | Performed by: PODIATRIST

## 2022-08-12 PROCEDURE — 3079F DIAST BP 80-89 MM HG: CPT | Mod: CPTII,S$GLB,, | Performed by: PODIATRIST

## 2022-08-12 PROCEDURE — 3079F PR MOST RECENT DIASTOLIC BLOOD PRESSURE 80-89 MM HG: ICD-10-PCS | Mod: CPTII,S$GLB,, | Performed by: PODIATRIST

## 2022-08-12 PROCEDURE — 3008F PR BODY MASS INDEX (BMI) DOCUMENTED: ICD-10-PCS | Mod: CPTII,S$GLB,, | Performed by: PODIATRIST

## 2022-08-12 PROCEDURE — 99999 PR PBB SHADOW E&M-EST. PATIENT-LVL III: ICD-10-PCS | Mod: PBBFAC,,, | Performed by: PODIATRIST

## 2022-08-12 NOTE — PROGRESS NOTES
Subjective:      Patient ID: Cameron Gaona is a 31 y.o. male.    Chief Complaint: Follow-up and Foot Pain (Bilateral )    Patient presents to clinic with chief complaint of bilateral bottom heel pain, right worse than left. Symptoms have been ongoing for several months. He reports pain is worsened with the first couple of steps in the morning and with the first couple of steps after prolonged sitting and symptoms improve with increased walking. Describes pain as throbbing, aching in nature.  He denies any recent trauma to the feet. Denies any prior treatments for the heel pain. He works as a  and wears boots throughout the day, otherwise tennis shoes. He does report to increased barefoot walking when at home.     02/17/2022:  Relates that previous plantar heel pain has resolved.  He has developed pain along the medial aspect of the right ankle which has been present for the past several months and most likely was overshadowed by the heel pain at the last visit.  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  Pain alleviated with rest however it is sometimes present rest.  No longer taking meloxicam since he did not notice a big difference.  Walking with tennis shoes.  He did not purchase new work boots as discussed.  He is using over-the-counter ankle brace which helps somewhat to the right ankle.    03/31/2022:  Follow-up for posterior tibial tendinitis and peroneal tendinitis to the right ankle.  He has been wearing right ankle brace as ordered and modifying his duties.  He has been on medical leave for work.  Attending physical therapy is ordered which seems to be working somewhat.  His pain will wax and wane depending on his activity.    05/12/2022:  Patient has been out of work for approximately 3 months.  Last physical therapy appointment was on 04/05/2022 however he missed a subsequent  "appointment and was not rescheduled.  Relates that he has been wearing his ankle brace throughout the day which helps.  Certain activities will cause pain the flare up.  States that he was washing his cardia there day and had moderate pain pointing to the medial and lateral aspects of the right ankle.  Repetitive motions such as driving also aggravate his pain.    08/12/2022:  Follow-up foot posterior tibial tendon dysfunction right ankle.  Reports that his pain has been improving since his activity has been modified at work.  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  He does not have any significant pain while at work however the pain does become more present at rest when he gets home.  He was fit for custom molded orthotics earlier this week and notes that will take 2-3 weeks receive the orthotics.  He is not wearing his ankle brace today.  He is ambulating with tennis shoes.  Reports no significant pain today although has some mild discomfort to the medial right ankle.  He also states that he gets some pain to left foot with extended periods of standing and walking in the same area but to a lesser degree than the right foot.    Vitals:    08/12/22 1107   BP: (!) 144/88   Pulse: 72   Height: 5' 5" (1.651 m)   PainSc: 0-No pain   PainLoc: Foot      No past medical history on file.    No past surgical history on file.    Family History   Problem Relation Age of Onset    No Known Problems Mother     No Known Problems Father        Social History     Socioeconomic History    Marital status: Single   Tobacco Use    Smoking status: Former Smoker    Smokeless tobacco: Former User   Substance and Sexual Activity    Alcohol use: Yes    Drug use: No       Current Outpatient Medications   Medication Sig Dispense Refill    ipratropium (ATROVENT) 21 mcg (0.03 %) nasal spray 2 sprays by Nasal route 3 (three) times daily. 30 mL 0    meloxicam (MOBIC) 15 MG tablet Take 1 tablet (15 mg " total) by mouth once daily. 30 tablet 1     No current facility-administered medications for this visit.       Review of patient's allergies indicates:   Allergen Reactions    Shrimp Swelling     Throat swelling           Review of Systems   Constitutional: Negative for chills, fever and malaise/fatigue.   HENT: Negative for hearing loss.    Cardiovascular: Negative for claudication.   Respiratory: Negative for cough, shortness of breath and wheezing.    Hematologic/Lymphatic: Does not bruise/bleed easily.   Skin: Negative for flushing, nail changes, rash and suspicious lesions.   Musculoskeletal: Positive for myalgias. Negative for back pain and joint pain.   Gastrointestinal: Negative for nausea and vomiting.   Neurological: Negative for loss of balance, numbness, paresthesias and sensory change.   Psychiatric/Behavioral: Negative for altered mental status.           Objective:      Physical Exam  Constitutional:       General: He is not in acute distress.     Appearance: He is obese. He is not ill-appearing.   Cardiovascular:      Pulses:           Dorsalis pedis pulses are 2+ on the right side and 2+ on the left side.        Posterior tibial pulses are 2+ on the right side and 2+ on the left side.      Comments: CFT< 3 secs all toes bilateral foot, skin temp warm to cool bilateral foot,  hair growth present to bilateral lower extremity, no edema to bilateral lower extremities    Musculoskeletal:      Comments: Flexible pes planovalgus foot type bilateral.  Pain on palpation overlying the posterior tibial tendon at the distal 1/3 to the navicular tuberosity right foot.  Mild pain with active resisted inversion of the right foot.  No pain on palpation along the course of peroneal tendons or with active resisted eversion the neutral plantar flexed position right foot.  No pain with range of motion to the right foot ankle.  No pain with stress eversion or inversion right ankle.  Negative anterior drawer  sign.    Note -20 degrees of dorsiflexion with the knee extended bilateral that reduces to near 0° with the knee flexed.    No pain on palpation call range of motion or manual muscle strength testing left foot ankle.    Standing heel raise test is intact bilateral during the double heel raise however there is significant weakness with attempted single heel raise bilateral right greater than left.    Too many toes sign right greater than left.           Skin:     General: Skin is warm.      Capillary Refill: Capillary refill takes less than 2 seconds.      Findings: No ecchymosis or erythema.      Nails: There is no clubbing.      Comments: Skin warm, dry, intact to bilateral feet. No open wounds, no discoloration, no signs of infection   Neurological:      Mental Status: He is alert and oriented to person, place, and time.      Sensory: Sensation is intact.      Motor: Motor function is intact.      Comments: Negative tinel sign to percussion bilateral.                Assessment:       Encounter Diagnoses   Name Primary?    Posterior tibial tendon dysfunction (PTTD) of right lower extremity Yes    Pronation deformity of both feet     Equinus contracture of ankle          Plan:       Cameron was seen today for follow-up and foot pain.    Diagnoses and all orders for this visit:    Posterior tibial tendon dysfunction (PTTD) of right lower extremity    Pronation deformity of both feet    Equinus contracture of ankle      I counseled the patient on his conditions, their implications and medical management.    We discussed continued activity restrictions as previously listed.  A letter was written and faxed to his worker's compensation group today.    Recommend continue use of the ankle brace as needed.  Pending custom orthotics as previously mentioned.    RTC within 6 weeks or p.r.n. as discussed.  We also briefly discussed gastrocnemius recession to help reduce the ankle equinus bilateral and allow him to wear his  orthotics with less pressure throughout the arch however we can make this determination as we move forward.    A portion of this note was generated by voice recognition software and may contain spelling and grammar errors.

## 2022-09-23 ENCOUNTER — OFFICE VISIT (OUTPATIENT)
Dept: PODIATRY | Facility: CLINIC | Age: 32
End: 2022-09-23
Payer: COMMERCIAL

## 2022-09-23 VITALS
BODY MASS INDEX: 36.1 KG/M2 | SYSTOLIC BLOOD PRESSURE: 138 MMHG | HEART RATE: 87 BPM | DIASTOLIC BLOOD PRESSURE: 73 MMHG | HEIGHT: 65 IN

## 2022-09-23 DIAGNOSIS — M76.821 POSTERIOR TIBIAL TENDON DYSFUNCTION (PTTD) OF BOTH LOWER EXTREMITIES: Primary | ICD-10-CM

## 2022-09-23 DIAGNOSIS — M76.822 POSTERIOR TIBIAL TENDON DYSFUNCTION (PTTD) OF BOTH LOWER EXTREMITIES: Primary | ICD-10-CM

## 2022-09-23 PROCEDURE — 99999 PR PBB SHADOW E&M-EST. PATIENT-LVL III: CPT | Mod: PBBFAC,,, | Performed by: PODIATRIST

## 2022-09-23 PROCEDURE — 1160F RVW MEDS BY RX/DR IN RCRD: CPT | Mod: CPTII,S$GLB,, | Performed by: PODIATRIST

## 2022-09-23 PROCEDURE — 3008F PR BODY MASS INDEX (BMI) DOCUMENTED: ICD-10-PCS | Mod: CPTII,S$GLB,, | Performed by: PODIATRIST

## 2022-09-23 PROCEDURE — 1159F PR MEDICATION LIST DOCUMENTED IN MEDICAL RECORD: ICD-10-PCS | Mod: CPTII,S$GLB,, | Performed by: PODIATRIST

## 2022-09-23 PROCEDURE — 3078F PR MOST RECENT DIASTOLIC BLOOD PRESSURE < 80 MM HG: ICD-10-PCS | Mod: CPTII,S$GLB,, | Performed by: PODIATRIST

## 2022-09-23 PROCEDURE — 1160F PR REVIEW ALL MEDS BY PRESCRIBER/CLIN PHARMACIST DOCUMENTED: ICD-10-PCS | Mod: CPTII,S$GLB,, | Performed by: PODIATRIST

## 2022-09-23 PROCEDURE — 3008F BODY MASS INDEX DOCD: CPT | Mod: CPTII,S$GLB,, | Performed by: PODIATRIST

## 2022-09-23 PROCEDURE — 1159F MED LIST DOCD IN RCRD: CPT | Mod: CPTII,S$GLB,, | Performed by: PODIATRIST

## 2022-09-23 PROCEDURE — 99999 PR PBB SHADOW E&M-EST. PATIENT-LVL III: ICD-10-PCS | Mod: PBBFAC,,, | Performed by: PODIATRIST

## 2022-09-23 PROCEDURE — 99213 PR OFFICE/OUTPT VISIT, EST, LEVL III, 20-29 MIN: ICD-10-PCS | Mod: S$GLB,,, | Performed by: PODIATRIST

## 2022-09-23 PROCEDURE — 3075F SYST BP GE 130 - 139MM HG: CPT | Mod: CPTII,S$GLB,, | Performed by: PODIATRIST

## 2022-09-23 PROCEDURE — 99213 OFFICE O/P EST LOW 20 MIN: CPT | Mod: S$GLB,,, | Performed by: PODIATRIST

## 2022-09-23 PROCEDURE — 3078F DIAST BP <80 MM HG: CPT | Mod: CPTII,S$GLB,, | Performed by: PODIATRIST

## 2022-09-23 PROCEDURE — 3075F PR MOST RECENT SYSTOLIC BLOOD PRESS GE 130-139MM HG: ICD-10-PCS | Mod: CPTII,S$GLB,, | Performed by: PODIATRIST

## 2022-09-23 NOTE — PROGRESS NOTES
Subjective:      Patient ID: Cameron Gaona is a 31 y.o. male.    Chief Complaint: Follow-up (Right foot )    Patient presents to clinic with chief complaint of bilateral bottom heel pain, right worse than left. Symptoms have been ongoing for several months. He reports pain is worsened with the first couple of steps in the morning and with the first couple of steps after prolonged sitting and symptoms improve with increased walking. Describes pain as throbbing, aching in nature.  He denies any recent trauma to the feet. Denies any prior treatments for the heel pain. He works as a  and wears boots throughout the day, otherwise tennis shoes. He does report to increased barefoot walking when at home.     02/17/2022:  Relates that previous plantar heel pain has resolved.  He has developed pain along the medial aspect of the right ankle which has been present for the past several months and most likely was overshadowed by the heel pain at the last visit.  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  Pain alleviated with rest however it is sometimes present rest.  No longer taking meloxicam since he did not notice a big difference.  Walking with tennis shoes.  He did not purchase new work boots as discussed.  He is using over-the-counter ankle brace which helps somewhat to the right ankle.    03/31/2022:  Follow-up for posterior tibial tendinitis and peroneal tendinitis to the right ankle.  He has been wearing right ankle brace as ordered and modifying his duties.  He has been on medical leave for work.  Attending physical therapy is ordered which seems to be working somewhat.  His pain will wax and wane depending on his activity.    05/12/2022:  Patient has been out of work for approximately 3 months.  Last physical therapy appointment was on 04/05/2022 however he missed a subsequent appointment and was  "not rescheduled.  Relates that he has been wearing his ankle brace throughout the day which helps.  Certain activities will cause pain the flare up.  States that he was washing his cardia there day and had moderate pain pointing to the medial and lateral aspects of the right ankle.  Repetitive motions such as driving also aggravate his pain.    08/12/2022:  Follow-up foot posterior tibial tendon dysfunction right ankle.  Reports that his pain has been improving since his activity has been modified at work.  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  He does not have any significant pain while at work however the pain does become more present at rest when he gets home.  He was fit for custom molded orthotics earlier this week and notes that will take 2-3 weeks receive the orthotics.  He is not wearing his ankle brace today.  He is ambulating with tennis shoes.  Reports no significant pain today although has some mild discomfort to the medial right ankle.  He also states that he gets some pain to left foot with extended periods of standing and walking in the same area but to a lesser degree than the right foot.    09/23/2022: Follow-up for posterior tibial tendon dysfunction of both lower extremities however he was more symptomatic on the right side.  States that he does not have any significant discomfort until he works more than 8 hours.  He relates that he is able to work without restrictions and has been so for while.  He is asking for the restrictions to be removed.  Relates that the pain is not bad enough to consider surgical intervention and he does not want to pursue any further conservative care at this time.  He is wearing his custom-molded orthotics in his work boots.    Vitals:    09/23/22 1523   BP: 138/73   Pulse: 87   Height: 5' 5" (1.651 m)   PainSc: 0-No pain   PainLoc: Foot      History reviewed. No pertinent past medical history.    History reviewed. No pertinent " surgical history.    Family History   Problem Relation Age of Onset    No Known Problems Mother     No Known Problems Father        Social History     Socioeconomic History    Marital status: Single   Tobacco Use    Smoking status: Former    Smokeless tobacco: Former   Substance and Sexual Activity    Alcohol use: Yes    Drug use: No       Current Outpatient Medications   Medication Sig Dispense Refill    ipratropium (ATROVENT) 21 mcg (0.03 %) nasal spray 2 sprays by Nasal route 3 (three) times daily. 30 mL 0    meloxicam (MOBIC) 15 MG tablet Take 1 tablet (15 mg total) by mouth once daily. 30 tablet 1     No current facility-administered medications for this visit.       Review of patient's allergies indicates:   Allergen Reactions    Shrimp Swelling     Throat swelling           Review of Systems   Constitutional: Negative for chills, fever and malaise/fatigue.   HENT:  Negative for hearing loss.    Cardiovascular:  Negative for claudication.   Respiratory:  Negative for cough, shortness of breath and wheezing.    Hematologic/Lymphatic: Does not bruise/bleed easily.   Skin:  Negative for flushing, nail changes, rash and suspicious lesions.   Musculoskeletal:  Positive for myalgias. Negative for back pain and joint pain.   Gastrointestinal:  Negative for nausea and vomiting.   Neurological:  Negative for loss of balance, numbness, paresthesias and sensory change.   Psychiatric/Behavioral:  Negative for altered mental status.          Objective:      Physical Exam  Constitutional:       General: He is not in acute distress.     Appearance: He is obese. He is not ill-appearing.   Cardiovascular:      Pulses:           Dorsalis pedis pulses are 2+ on the right side and 2+ on the left side.        Posterior tibial pulses are 2+ on the right side and 2+ on the left side.      Comments: CFT< 3 secs all toes bilateral foot, skin temp warm to cool bilateral foot,  hair growth present to bilateral lower extremity, no edema  to bilateral lower extremities    Musculoskeletal:      Comments: Flexible pes planovalgus foot type bilateral.  Pain on palpation overlying the posterior tibial tendon at the distal 1/3 to the navicular tuberosity right foot.  No pain with active resisted inversion of the right foot however some mild pain with active resisted inversion the left foot.  No pain on palpation along the course of peroneal tendons or with active resisted eversion the neutral plantar flexed position bilateral foot.  No pain with range of motion to the bilateral foot ankle.  No pain with stress eversion or inversion bilateral ankle.  Negative anterior drawer sign bilateral ankle.    Note -20 degrees of dorsiflexion with the knee extended bilateral that reduces to near 0° with the knee flexed.    No pain on palpation call range of motion or manual muscle strength testing left foot ankle.    Standing heel raise test is intact bilateral during the double heel raise however there is significant weakness with attempted single heel raise bilateral right greater than left.    Too many toes sign right greater than left.           Skin:     General: Skin is warm.      Capillary Refill: Capillary refill takes less than 2 seconds.      Findings: No ecchymosis or erythema.      Nails: There is no clubbing.      Comments: Skin warm, dry, intact to bilateral feet. No open wounds, no discoloration, no signs of infection   Neurological:      Mental Status: He is alert and oriented to person, place, and time.      Sensory: Sensation is intact.      Motor: Motor function is intact.      Comments: Negative tinel sign to percussion bilateral.              Assessment:       Encounter Diagnosis   Name Primary?    Posterior tibial tendon dysfunction (PTTD) of both lower extremities Yes         Plan:       Cameron was seen today for follow-up.    Diagnoses and all orders for this visit:    Posterior tibial tendon dysfunction (PTTD) of both lower extremities    I  counseled the patient on his conditions, their implications and medical management.    Patient elected to have all restrictions removed.  States that he is capable of living with this amount of discomfort and that the pain will wax and wane in that he is relatively asymptomatic when he is not working.    Continue with conservative care such as a custom molded orthotics.  We discussed stretching throughout his work shift as well.      We briefly discussed surgical intervention once again however patient declined.    RTC p.r.n. as discussed.    A portion of this note was generated by voice recognition software and may contain spelling and grammar errors.

## 2023-02-06 ENCOUNTER — OFFICE VISIT (OUTPATIENT)
Dept: FAMILY MEDICINE | Facility: CLINIC | Age: 33
End: 2023-02-06
Payer: COMMERCIAL

## 2023-02-06 VITALS
OXYGEN SATURATION: 99 % | BODY MASS INDEX: 36.55 KG/M2 | DIASTOLIC BLOOD PRESSURE: 74 MMHG | WEIGHT: 219.38 LBS | SYSTOLIC BLOOD PRESSURE: 138 MMHG | HEART RATE: 67 BPM | HEIGHT: 65 IN

## 2023-02-06 DIAGNOSIS — R06.89 HEAVY BREATHING: ICD-10-CM

## 2023-02-06 DIAGNOSIS — R40.0 DAYTIME SOMNOLENCE: ICD-10-CM

## 2023-02-06 DIAGNOSIS — R53.83 FATIGUE, UNSPECIFIED TYPE: ICD-10-CM

## 2023-02-06 DIAGNOSIS — G47.8 POOR SLEEP PATTERN: ICD-10-CM

## 2023-02-06 DIAGNOSIS — R06.83 SNORING: Primary | ICD-10-CM

## 2023-02-06 PROCEDURE — 99213 OFFICE O/P EST LOW 20 MIN: CPT | Mod: S$GLB,,, | Performed by: FAMILY MEDICINE

## 2023-02-06 PROCEDURE — 3078F DIAST BP <80 MM HG: CPT | Mod: CPTII,S$GLB,, | Performed by: FAMILY MEDICINE

## 2023-02-06 PROCEDURE — 1160F PR REVIEW ALL MEDS BY PRESCRIBER/CLIN PHARMACIST DOCUMENTED: ICD-10-PCS | Mod: CPTII,S$GLB,, | Performed by: FAMILY MEDICINE

## 2023-02-06 PROCEDURE — 3078F PR MOST RECENT DIASTOLIC BLOOD PRESSURE < 80 MM HG: ICD-10-PCS | Mod: CPTII,S$GLB,, | Performed by: FAMILY MEDICINE

## 2023-02-06 PROCEDURE — 1159F PR MEDICATION LIST DOCUMENTED IN MEDICAL RECORD: ICD-10-PCS | Mod: CPTII,S$GLB,, | Performed by: FAMILY MEDICINE

## 2023-02-06 PROCEDURE — 3075F SYST BP GE 130 - 139MM HG: CPT | Mod: CPTII,S$GLB,, | Performed by: FAMILY MEDICINE

## 2023-02-06 PROCEDURE — 3008F BODY MASS INDEX DOCD: CPT | Mod: CPTII,S$GLB,, | Performed by: FAMILY MEDICINE

## 2023-02-06 PROCEDURE — 3075F PR MOST RECENT SYSTOLIC BLOOD PRESS GE 130-139MM HG: ICD-10-PCS | Mod: CPTII,S$GLB,, | Performed by: FAMILY MEDICINE

## 2023-02-06 PROCEDURE — 99213 PR OFFICE/OUTPT VISIT, EST, LEVL III, 20-29 MIN: ICD-10-PCS | Mod: S$GLB,,, | Performed by: FAMILY MEDICINE

## 2023-02-06 PROCEDURE — 3008F PR BODY MASS INDEX (BMI) DOCUMENTED: ICD-10-PCS | Mod: CPTII,S$GLB,, | Performed by: FAMILY MEDICINE

## 2023-02-06 PROCEDURE — 1160F RVW MEDS BY RX/DR IN RCRD: CPT | Mod: CPTII,S$GLB,, | Performed by: FAMILY MEDICINE

## 2023-02-06 PROCEDURE — 1159F MED LIST DOCD IN RCRD: CPT | Mod: CPTII,S$GLB,, | Performed by: FAMILY MEDICINE

## 2023-02-13 NOTE — PROGRESS NOTES
" Patient ID: Cameron Gaona is a 32 y.o. male.    Chief Complaint: Back Pain    HPI      Cameron Gaona is a 32 y.o. male co of upper back pain across shoulders which for the most part has resolved.  Pt with symptoms of sleep apnea. Had sleep study ordered abut too expensive co pay.  NOW better position to get this done.    Vitals:    02/06/23 1139   BP: 138/74   Pulse: 67   SpO2: 99%   Weight: 99.5 kg (219 lb 5.7 oz)   Height: 5' 5" (1.651 m)            Review of Symptoms      Physical Exam    Constitutional:  Oriented to person, place, and time.appears well-developed and well-nourished.  No distress.      HENT  Head: Normocephalic and atraumatic  Right Ear: External ear normal.   Left Ear: External ear normal.   Nose: External nose normal.   Mouth:  Moist mucus membranes.    Eyes:  Conjunctivae are normal. Right eye exhibits no discharge.  Left eye exhibits no discharge. No scleral icterus.  No periorbital edema    Cardiovascular:  Regular rate and rhythm with normal S1 and S2     Pulmonary/Chest:   Clear to auscultation bilaterally without wheezes, rhonchi or rales      Musculoskeletal:  No edema. No obvious deformity No wasting       Neurological:  Alert and oriented to person, place, and time.   Coordination normal.     Skin:   Skin is warm and dry.  No diaphoresis.   No rash noted.     Psychiatric: Normal mood and affect. Behavior is normal.  Judgment and thought content normal.     Complete Blood Count  Lab Results   Component Value Date    RBC 5.10 08/27/2021    HGB 14.2 08/27/2021    HCT 44.4 08/27/2021    MCV 87 08/27/2021    MCH 27.8 08/27/2021    MCHC 32.0 08/27/2021    RDW 13.1 08/27/2021     08/27/2021    MPV 9.5 08/27/2021    GRAN 2.5 08/27/2021    GRAN 46.9 08/27/2021    LYMPH 2.2 08/27/2021    LYMPH 42.3 08/27/2021    MONO 0.4 08/27/2021    MONO 7.9 08/27/2021    EOS 0.1 08/27/2021    BASO 0.01 08/27/2021    EOSINOPHIL 2.5 08/27/2021    BASOPHIL 0.2 08/27/2021    DIFFMETHOD Automated " 08/27/2021       Comprehensive Metabolic Panel  No results found for: GLU, BUN, CREATININE, NA, K, CL, PROT, ALBUMIN, BILITOT, AST, ALKPHOS, CO2, ALT, ANIONGAP, EGFRNONAA, ESTGFRAFRICA    TSH  No results found for: TSH    Assessment / Plan:      ICD-10-CM ICD-9-CM   1. Snoring  R06.83 786.09   2. Heavy breathing  R06.89 786.09   3. Fatigue, unspecified type  R53.83 780.79   4. Poor sleep pattern  G47.8 780.59   5. Daytime somnolence  R40.0 780.54     Snoring  -     Home Sleep Study; Future    Heavy breathing  -     Home Sleep Study; Future    Fatigue, unspecified type  -     Home Sleep Study; Future    Poor sleep pattern  -     Home Sleep Study; Future    Daytime somnolence  -     Home Sleep Study; Future

## 2023-02-14 ENCOUNTER — PATIENT MESSAGE (OUTPATIENT)
Dept: FAMILY MEDICINE | Facility: CLINIC | Age: 33
End: 2023-02-14
Payer: COMMERCIAL

## 2023-02-15 ENCOUNTER — TELEPHONE (OUTPATIENT)
Dept: SLEEP MEDICINE | Facility: OTHER | Age: 33
End: 2023-02-15
Payer: COMMERCIAL

## 2023-03-31 ENCOUNTER — TELEPHONE (OUTPATIENT)
Dept: SLEEP MEDICINE | Facility: OTHER | Age: 33
End: 2023-03-31
Payer: COMMERCIAL

## 2023-04-04 ENCOUNTER — OFFICE VISIT (OUTPATIENT)
Dept: FAMILY MEDICINE | Facility: CLINIC | Age: 33
End: 2023-04-04
Payer: COMMERCIAL

## 2023-04-04 VITALS
HEIGHT: 65 IN | SYSTOLIC BLOOD PRESSURE: 126 MMHG | DIASTOLIC BLOOD PRESSURE: 66 MMHG | HEART RATE: 93 BPM | BODY MASS INDEX: 35.26 KG/M2 | TEMPERATURE: 99 F | OXYGEN SATURATION: 97 % | WEIGHT: 211.63 LBS

## 2023-04-04 DIAGNOSIS — R10.9 ABDOMINAL PAIN, UNSPECIFIED ABDOMINAL LOCATION: Primary | ICD-10-CM

## 2023-04-04 PROCEDURE — 3078F DIAST BP <80 MM HG: CPT | Mod: CPTII,S$GLB,, | Performed by: FAMILY MEDICINE

## 2023-04-04 PROCEDURE — 99213 OFFICE O/P EST LOW 20 MIN: CPT | Mod: S$GLB,,, | Performed by: FAMILY MEDICINE

## 2023-04-04 PROCEDURE — 1160F PR REVIEW ALL MEDS BY PRESCRIBER/CLIN PHARMACIST DOCUMENTED: ICD-10-PCS | Mod: CPTII,S$GLB,, | Performed by: FAMILY MEDICINE

## 2023-04-04 PROCEDURE — 3078F PR MOST RECENT DIASTOLIC BLOOD PRESSURE < 80 MM HG: ICD-10-PCS | Mod: CPTII,S$GLB,, | Performed by: FAMILY MEDICINE

## 2023-04-04 PROCEDURE — 1159F MED LIST DOCD IN RCRD: CPT | Mod: CPTII,S$GLB,, | Performed by: FAMILY MEDICINE

## 2023-04-04 PROCEDURE — 3008F PR BODY MASS INDEX (BMI) DOCUMENTED: ICD-10-PCS | Mod: CPTII,S$GLB,, | Performed by: FAMILY MEDICINE

## 2023-04-04 PROCEDURE — 99213 PR OFFICE/OUTPT VISIT, EST, LEVL III, 20-29 MIN: ICD-10-PCS | Mod: S$GLB,,, | Performed by: FAMILY MEDICINE

## 2023-04-04 PROCEDURE — 3074F PR MOST RECENT SYSTOLIC BLOOD PRESSURE < 130 MM HG: ICD-10-PCS | Mod: CPTII,S$GLB,, | Performed by: FAMILY MEDICINE

## 2023-04-04 PROCEDURE — 3008F BODY MASS INDEX DOCD: CPT | Mod: CPTII,S$GLB,, | Performed by: FAMILY MEDICINE

## 2023-04-04 PROCEDURE — 3074F SYST BP LT 130 MM HG: CPT | Mod: CPTII,S$GLB,, | Performed by: FAMILY MEDICINE

## 2023-04-04 PROCEDURE — 1160F RVW MEDS BY RX/DR IN RCRD: CPT | Mod: CPTII,S$GLB,, | Performed by: FAMILY MEDICINE

## 2023-04-04 PROCEDURE — 1159F PR MEDICATION LIST DOCUMENTED IN MEDICAL RECORD: ICD-10-PCS | Mod: CPTII,S$GLB,, | Performed by: FAMILY MEDICINE

## 2023-04-10 NOTE — PROGRESS NOTES
" Patient ID: Cameron Gaona is a 32 y.o. male.    Chief Complaint: Abdominal Pain and Chest Pain    HPI      Cameron Gaona is a 32 y.o. male complains of abdominal pain.  Patient with somewhat acute onset of abdominal pain.  No associated nausea vomiting diarrhea.  Feeling of fullness along his stomach and epigastric region.  No fever chills.  Has had decreased bowel movements.    Vitals:    04/04/23 1148   BP: 126/66   BP Location: Right arm   Patient Position: Sitting   Pulse: 93   Temp: 98.5 °F (36.9 °C)   TempSrc: Oral   SpO2: 97%   Weight: 96 kg (211 lb 10.3 oz)   Height: 5' 5" (1.651 m)            Review of Symptoms      Physical Exam    Constitutional:  Oriented to person, place, and time.appears well-developed and well-nourished.  No distress.      HENT  Head: Normocephalic and atraumatic  Right Ear: External ear normal.   Left Ear: External ear normal.   Nose: External nose normal.   Mouth:  Moist mucus membranes.    Eyes:  Conjunctivae are normal. Right eye exhibits no discharge.  Left eye exhibits no discharge. No scleral icterus.  No periorbital edema    Cardiovascular:  Regular rate and rhythm with normal S1 and S2     Pulmonary/Chest:   Clear to auscultation bilaterally without wheezes, rhonchi or rales      Musculoskeletal:  No edema. No obvious deformity No wasting    Abdomen:  Soft non tender, non distended, No hepatic or splenic enlargement.  No rebound or guarding.     Neurological:  Alert and oriented to person, place, and time.   Coordination normal.     Skin:   Skin is warm and dry.  No diaphoresis.   No rash noted.     Psychiatric: Normal mood and affect. Behavior is normal.  Judgment and thought content normal.     Complete Blood Count  Lab Results   Component Value Date    RBC 5.10 08/27/2021    HGB 14.2 08/27/2021    HCT 44.4 08/27/2021    MCV 87 08/27/2021    MCH 27.8 08/27/2021    MCHC 32.0 08/27/2021    RDW 13.1 08/27/2021     08/27/2021    MPV 9.5 08/27/2021    GRAN 2.5 " 08/27/2021    GRAN 46.9 08/27/2021    LYMPH 2.2 08/27/2021    LYMPH 42.3 08/27/2021    MONO 0.4 08/27/2021    MONO 7.9 08/27/2021    EOS 0.1 08/27/2021    BASO 0.01 08/27/2021    EOSINOPHIL 2.5 08/27/2021    BASOPHIL 0.2 08/27/2021    DIFFMETHOD Automated 08/27/2021       Comprehensive Metabolic Panel  No results found for: GLU, BUN, CREATININE, NA, K, CL, PROT, ALBUMIN, BILITOT, AST, ALKPHOS, CO2, ALT, ANIONGAP, EGFRNONAA, ESTGFRAFRICA    TSH  No results found for: TSH    Assessment / Plan:      ICD-10-CM ICD-9-CM   1. Abdominal pain, unspecified abdominal location  R10.9 789.00     Abdominal pain, unspecified abdominal location    Believe this associated with constipation-discussed laxative and stool softeners post laxative

## 2023-04-19 ENCOUNTER — PATIENT MESSAGE (OUTPATIENT)
Dept: RESEARCH | Facility: HOSPITAL | Age: 33
End: 2023-04-19
Payer: COMMERCIAL

## 2023-05-10 ENCOUNTER — PATIENT MESSAGE (OUTPATIENT)
Dept: FAMILY MEDICINE | Facility: CLINIC | Age: 33
End: 2023-05-10
Payer: COMMERCIAL

## 2023-05-10 DIAGNOSIS — G47.33 OBSTRUCTIVE SLEEP APNEA: Primary | ICD-10-CM

## 2023-05-11 ENCOUNTER — PATIENT MESSAGE (OUTPATIENT)
Dept: FAMILY MEDICINE | Facility: CLINIC | Age: 33
End: 2023-05-11
Payer: COMMERCIAL

## 2023-06-01 ENCOUNTER — PATIENT MESSAGE (OUTPATIENT)
Dept: FAMILY MEDICINE | Facility: CLINIC | Age: 33
End: 2023-06-01
Payer: COMMERCIAL

## 2023-06-06 ENCOUNTER — PATIENT MESSAGE (OUTPATIENT)
Dept: FAMILY MEDICINE | Facility: CLINIC | Age: 33
End: 2023-06-06
Payer: COMMERCIAL

## 2023-06-08 NOTE — TELEPHONE ENCOUNTER
The report is in media  I have already electronically faxed the report to [facility] for some reason they could not open it

## 2023-06-09 NOTE — TELEPHONE ENCOUNTER
Abran Aleman LPN  Phone Number: [phone]     Good afternoon! I received the sleep study but it is a sleep report for the patient and it does not have a date of when the sleep study was done or signed by the Dr. Is there another sleep study report from the company that did the sleep study? Thanks!

## 2023-08-23 ENCOUNTER — TELEPHONE (OUTPATIENT)
Dept: FAMILY MEDICINE | Facility: CLINIC | Age: 33
End: 2023-08-23
Payer: COMMERCIAL

## 2023-08-23 ENCOUNTER — PATIENT MESSAGE (OUTPATIENT)
Dept: FAMILY MEDICINE | Facility: CLINIC | Age: 33
End: 2023-08-23
Payer: COMMERCIAL

## 2023-08-23 NOTE — TELEPHONE ENCOUNTER
Appointment Request From: Cameron Gaona      With Provider: Matthias Richard MD [[facility]]      Preferred Date Range: Any      Preferred Times: Any Time      Reason for visit: Forehead skin discoloration      Comments:   To make sure it's nothing serious        Spoke to pt he stated that he has been out in the sun a lot and noticed a discolored on his forehead. Pt unsure if it's sunburn. I informed pt to upload photo to the portal for md to review.

## 2023-09-09 ENCOUNTER — PATIENT MESSAGE (OUTPATIENT)
Dept: FAMILY MEDICINE | Facility: CLINIC | Age: 33
End: 2023-09-09
Payer: COMMERCIAL

## 2024-01-04 ENCOUNTER — PATIENT MESSAGE (OUTPATIENT)
Dept: FAMILY MEDICINE | Facility: CLINIC | Age: 34
End: 2024-01-04
Payer: COMMERCIAL

## 2024-01-23 ENCOUNTER — OFFICE VISIT (OUTPATIENT)
Dept: FAMILY MEDICINE | Facility: CLINIC | Age: 34
End: 2024-01-23
Payer: COMMERCIAL

## 2024-01-23 VITALS
SYSTOLIC BLOOD PRESSURE: 138 MMHG | HEIGHT: 65 IN | HEART RATE: 93 BPM | DIASTOLIC BLOOD PRESSURE: 90 MMHG | BODY MASS INDEX: 36.94 KG/M2 | WEIGHT: 221.69 LBS | OXYGEN SATURATION: 96 % | TEMPERATURE: 99 F

## 2024-01-23 DIAGNOSIS — R06.83 SNORING: ICD-10-CM

## 2024-01-23 DIAGNOSIS — Z00.00 ROUTINE HEALTH MAINTENANCE: ICD-10-CM

## 2024-01-23 DIAGNOSIS — R06.89 HEAVY BREATHING: ICD-10-CM

## 2024-01-23 DIAGNOSIS — E66.01 SEVERE OBESITY (BMI 35.0-39.9) WITH COMORBIDITY: ICD-10-CM

## 2024-01-23 DIAGNOSIS — Z20.2 EXPOSURE TO STD: ICD-10-CM

## 2024-01-23 DIAGNOSIS — G47.8 POOR SLEEP PATTERN: ICD-10-CM

## 2024-01-23 DIAGNOSIS — G47.33 OBSTRUCTIVE SLEEP APNEA: Primary | ICD-10-CM

## 2024-01-23 PROCEDURE — 3080F DIAST BP >= 90 MM HG: CPT | Mod: CPTII,S$GLB,, | Performed by: FAMILY MEDICINE

## 2024-01-23 PROCEDURE — 1159F MED LIST DOCD IN RCRD: CPT | Mod: CPTII,S$GLB,, | Performed by: FAMILY MEDICINE

## 2024-01-23 PROCEDURE — 1160F RVW MEDS BY RX/DR IN RCRD: CPT | Mod: CPTII,S$GLB,, | Performed by: FAMILY MEDICINE

## 2024-01-23 PROCEDURE — 3075F SYST BP GE 130 - 139MM HG: CPT | Mod: CPTII,S$GLB,, | Performed by: FAMILY MEDICINE

## 2024-01-23 PROCEDURE — 3008F BODY MASS INDEX DOCD: CPT | Mod: CPTII,S$GLB,, | Performed by: FAMILY MEDICINE

## 2024-01-23 PROCEDURE — 99214 OFFICE O/P EST MOD 30 MIN: CPT | Mod: S$GLB,,, | Performed by: FAMILY MEDICINE

## 2024-01-26 ENCOUNTER — LAB VISIT (OUTPATIENT)
Dept: LAB | Facility: HOSPITAL | Age: 34
End: 2024-01-26
Attending: FAMILY MEDICINE
Payer: COMMERCIAL

## 2024-01-26 DIAGNOSIS — Z20.2 EXPOSURE TO STD: ICD-10-CM

## 2024-01-26 DIAGNOSIS — Z00.00 ROUTINE HEALTH MAINTENANCE: ICD-10-CM

## 2024-01-26 DIAGNOSIS — G47.33 OBSTRUCTIVE SLEEP APNEA: ICD-10-CM

## 2024-01-26 LAB
ALBUMIN SERPL BCP-MCNC: 4.4 G/DL (ref 3.5–5.2)
ALP SERPL-CCNC: 93 U/L (ref 38–126)
ALT SERPL W/O P-5'-P-CCNC: 51 U/L (ref 10–44)
ANION GAP SERPL CALC-SCNC: 7 MMOL/L (ref 8–16)
AST SERPL-CCNC: 40 U/L (ref 15–46)
BASOPHILS # BLD AUTO: 0.03 K/UL (ref 0–0.2)
BASOPHILS NFR BLD: 0.5 % (ref 0–1.9)
BILIRUB SERPL-MCNC: 0.6 MG/DL (ref 0.1–1)
CALCIUM SERPL-MCNC: 9.1 MG/DL (ref 8.7–10.5)
CHLORIDE SERPL-SCNC: 103 MMOL/L (ref 95–110)
CHOLEST SERPL-MCNC: 168 MG/DL (ref 120–199)
CHOLEST/HDLC SERPL: 4.5 {RATIO} (ref 2–5)
CO2 SERPL-SCNC: 30 MMOL/L (ref 23–29)
CREAT SERPL-MCNC: 1.12 MG/DL (ref 0.5–1.4)
DIFFERENTIAL METHOD BLD: ABNORMAL
EOSINOPHIL # BLD AUTO: 0.1 K/UL (ref 0–0.5)
EOSINOPHIL NFR BLD: 2 % (ref 0–8)
ERYTHROCYTE [DISTWIDTH] IN BLOOD BY AUTOMATED COUNT: 12.8 % (ref 11.5–14.5)
EST. GFR  (NO RACE VARIABLE): >60 ML/MIN/1.73 M^2
GLUCOSE SERPL-MCNC: 91 MG/DL (ref 70–110)
HCT VFR BLD AUTO: 45.7 % (ref 40–54)
HCV AB SERPL QL IA: NORMAL
HDLC SERPL-MCNC: 37 MG/DL (ref 40–75)
HDLC SERPL: 22 % (ref 20–50)
HGB BLD-MCNC: 14.6 G/DL (ref 14–18)
HIV 1+2 AB+HIV1 P24 AG SERPL QL IA: NORMAL
IMM GRANULOCYTES # BLD AUTO: 0.02 K/UL (ref 0–0.04)
IMM GRANULOCYTES NFR BLD AUTO: 0.3 % (ref 0–0.5)
LDLC SERPL CALC-MCNC: 118.8 MG/DL (ref 63–159)
LYMPHOCYTES # BLD AUTO: 2.4 K/UL (ref 1–4.8)
LYMPHOCYTES NFR BLD: 39.7 % (ref 18–48)
MCH RBC QN AUTO: 27.7 PG (ref 27–31)
MCHC RBC AUTO-ENTMCNC: 31.9 G/DL (ref 32–36)
MCV RBC AUTO: 87 FL (ref 82–98)
MONOCYTES # BLD AUTO: 0.7 K/UL (ref 0.3–1)
MONOCYTES NFR BLD: 11.3 % (ref 4–15)
NEUTROPHILS # BLD AUTO: 2.8 K/UL (ref 1.8–7.7)
NEUTROPHILS NFR BLD: 46.2 % (ref 38–73)
NONHDLC SERPL-MCNC: 131 MG/DL
NRBC BLD-RTO: 0 /100 WBC
PLATELET # BLD AUTO: 339 K/UL (ref 150–450)
PMV BLD AUTO: 9.6 FL (ref 9.2–12.9)
POTASSIUM SERPL-SCNC: 4.1 MMOL/L (ref 3.5–5.1)
PROT SERPL-MCNC: 7.9 G/DL (ref 6–8.4)
RBC # BLD AUTO: 5.28 M/UL (ref 4.6–6.2)
SODIUM SERPL-SCNC: 140 MMOL/L (ref 136–145)
TRIGL SERPL-MCNC: 61 MG/DL (ref 30–150)
TSH SERPL DL<=0.005 MIU/L-ACNC: 1.18 UIU/ML (ref 0.4–4)
UUN UR-MCNC: 14 MG/DL (ref 2–20)
WBC # BLD AUTO: 6.02 K/UL (ref 3.9–12.7)

## 2024-01-26 PROCEDURE — 80053 COMPREHEN METABOLIC PANEL: CPT | Mod: PN | Performed by: FAMILY MEDICINE

## 2024-01-26 PROCEDURE — 85025 COMPLETE CBC W/AUTO DIFF WBC: CPT | Mod: PN | Performed by: FAMILY MEDICINE

## 2024-01-26 PROCEDURE — 84443 ASSAY THYROID STIM HORMONE: CPT | Mod: PN | Performed by: FAMILY MEDICINE

## 2024-01-26 PROCEDURE — 36415 COLL VENOUS BLD VENIPUNCTURE: CPT | Mod: PN | Performed by: FAMILY MEDICINE

## 2024-01-26 PROCEDURE — 80061 LIPID PANEL: CPT | Performed by: FAMILY MEDICINE

## 2024-01-26 PROCEDURE — 86803 HEPATITIS C AB TEST: CPT | Mod: PN | Performed by: FAMILY MEDICINE

## 2024-01-26 PROCEDURE — 87389 HIV-1 AG W/HIV-1&-2 AB AG IA: CPT | Mod: PN | Performed by: FAMILY MEDICINE

## 2024-01-28 ENCOUNTER — PATIENT MESSAGE (OUTPATIENT)
Dept: FAMILY MEDICINE | Facility: CLINIC | Age: 34
End: 2024-01-28
Payer: COMMERCIAL

## 2024-01-31 ENCOUNTER — TELEPHONE (OUTPATIENT)
Dept: FAMILY MEDICINE | Facility: CLINIC | Age: 34
End: 2024-01-31
Payer: COMMERCIAL

## 2024-01-31 DIAGNOSIS — G47.30 SLEEP APNEA, UNSPECIFIED TYPE: Primary | ICD-10-CM

## 2024-01-31 NOTE — TELEPHONE ENCOUNTER
----- Message from Abran Sommers sent at 1/31/2024  2:03 PM CST -----  Regarding: [employer]  Contact: [phone]  Good afternoon! Patient will need to do an in lab sleep study, we cannot use that Home Sleep Study since it does not have a date or signed by the Dr. Thanks! Kristina

## 2024-02-04 NOTE — PROGRESS NOTES
" Patient ID: Cameron Gaona is a 33 y.o. male.    Chief Complaint: Hypertension, Headache, falling asleep, and Shortness of Breath    HPI      Cameron Gaona is a 33 y.o. male here for evaluation.  Patient has elevated blood pressure been having headaches difficulty falling asleep and staying asleep.  Some shortness of the breath.  History of having sleep apnea-but does not have a CPAP machine that he is using.  Vitals:    01/23/24 0946 01/23/24 1029   BP: (!) 138/92 (!) 138/90   BP Location: Left arm Left arm   Patient Position: Sitting Sitting   Pulse: 93    Temp: 98.9 °F (37.2 °C)    TempSrc: Oral    SpO2: 96%    Weight: 100.5 kg (221 lb 10.8 oz)    Height: 5' 5" (1.651 m)             Review of Symptoms      Physical Exam    Constitutional:  Oriented to person, place, and time.appears well-developed and well-nourished.  No distress.      HENT  Head: Normocephalic and atraumatic  Right Ear: External ear normal.   Left Ear: External ear normal.   Nose: External nose normal.   Mouth:  Moist mucus membranes.    Eyes:  Conjunctivae are normal. Right eye exhibits no discharge.  Left eye exhibits no discharge. No scleral icterus.  No periorbital edema    Cardiovascular:  Regular rate and rhythm with normal S1 and S2     Pulmonary/Chest:   Clear to auscultation bilaterally without wheezes, rhonchi or rales      Musculoskeletal:  No edema. No obvious deformity No wasting       Neurological:  Alert and oriented to person, place, and time.   Coordination normal.     Skin:   Skin is warm and dry.  No diaphoresis.   No rash noted.     Psychiatric: Normal mood and affect. Behavior is normal.  Judgment and thought content normal.     Complete Blood Count  Lab Results   Component Value Date    RBC 5.28 01/26/2024    HGB 14.6 01/26/2024    HCT 45.7 01/26/2024    MCV 87 01/26/2024    MCH 27.7 01/26/2024    MCHC 31.9 (L) 01/26/2024    RDW 12.8 01/26/2024     01/26/2024    MPV 9.6 01/26/2024    GRAN 2.8 01/26/2024    " GRAN 46.2 01/26/2024    LYMPH 2.4 01/26/2024    LYMPH 39.7 01/26/2024    MONO 0.7 01/26/2024    MONO 11.3 01/26/2024    EOS 0.1 01/26/2024    BASO 0.03 01/26/2024    EOSINOPHIL 2.0 01/26/2024    BASOPHIL 0.5 01/26/2024    DIFFMETHOD Automated 01/26/2024       Comprehensive Metabolic Panel  Lab Results   Component Value Date    GLU 91 01/26/2024    BUN 14 01/26/2024    CREATININE 1.12 01/26/2024     01/26/2024    K 4.1 01/26/2024     01/26/2024    PROT 7.9 01/26/2024    ALBUMIN 4.4 01/26/2024    BILITOT 0.6 01/26/2024    AST 40 01/26/2024    ALKPHOS 93 01/26/2024    CO2 30 (H) 01/26/2024    ALT 51 (H) 01/26/2024    ANIONGAP 7 (L) 01/26/2024       TSH  Lab Results   Component Value Date    TSH 1.180 01/26/2024       Assessment / Plan:      ICD-10-CM ICD-9-CM   1. Obstructive sleep apnea  G47.33 327.23   2. Severe obesity (BMI 35.0-39.9) with comorbidity  E66.01 278.01   3. Routine health maintenance  Z00.00 V70.0   4. Exposure to STD  Z20.2 V01.6   5. Snoring  R06.83 786.09   6. Heavy breathing  R06.89 786.09   7. Poor sleep pattern  G47.8 780.59     Obstructive sleep apnea  -     CPAP FOR HOME USE  -     Comprehensive Metabolic Panel; Future  -     CBC Auto Differential; Future  -     Lipid Panel; Future  -     TSH; Future    Severe obesity (BMI 35.0-39.9) with comorbidity  -     CPAP FOR HOME USE    Routine health maintenance  -     Comprehensive Metabolic Panel; Future  -     CBC Auto Differential; Future  -     Lipid Panel; Future  -     TSH; Future    Exposure to STD  -     C. trachomatis/N. gonorrhoeae by AMP DNA; Future; Expected date: 01/23/2024  -     HIV 1/2 Ag/Ab (4th Gen); Future  -     Hepatitis C Antibody; Future; Expected date: 01/23/2024    Snoring    Heavy breathing    Poor sleep pattern    Discussed sleep apnea not would be very good he would use the machine it would help reduce his blood pressure readings as well as decreased fatigue and better for him overall long term.    Elevated  blood pressures-observe recheck

## 2024-02-08 ENCOUNTER — TELEPHONE (OUTPATIENT)
Dept: SLEEP MEDICINE | Facility: OTHER | Age: 34
End: 2024-02-08
Payer: COMMERCIAL

## 2024-02-27 ENCOUNTER — HOSPITAL ENCOUNTER (OUTPATIENT)
Dept: SLEEP MEDICINE | Facility: HOSPITAL | Age: 34
Discharge: HOME | End: 2024-02-27
Attending: FAMILY MEDICINE
Payer: COMMERCIAL

## 2024-02-27 DIAGNOSIS — G47.33 OSA (OBSTRUCTIVE SLEEP APNEA): Primary | ICD-10-CM

## 2024-02-27 DIAGNOSIS — G47.30 SLEEP APNEA, UNSPECIFIED TYPE: ICD-10-CM

## 2024-02-27 PROCEDURE — 95800 SLP STDY UNATTENDED: CPT

## 2024-03-06 PROCEDURE — 95806 SLEEP STUDY UNATT&RESP EFFT: CPT | Mod: 26,52,, | Performed by: PSYCHIATRY & NEUROLOGY

## 2024-03-13 PROBLEM — G47.33 OSA (OBSTRUCTIVE SLEEP APNEA): Status: ACTIVE | Noted: 2024-03-13

## 2024-03-13 PROBLEM — G47.30 SLEEP APNEA: Status: ACTIVE | Noted: 2024-03-13

## 2024-03-13 NOTE — PROCEDURES
"Hi Dr. Ekaterina Wheeler,     You have ordered sleep LAB services to perform the sleep study for Cameron Gaona. The sleep study that you ordered is complete.     Please find Sleep Study result in  the "Media tab" of Chart Review; you can look  for the report in the  Media by the document type "Sleep Study Documents".       As the ordering provider, you are responsible for reviewing the results and implementing a treatment plan with your patient.    If you need a Sleep Medicine provider to explain the sleep study findings and arrange treatment for the patient, please refer patient for consultation to our Sleep Clinic via [facility] with Ambulatory Consult Sleep.    To do that please place an order for an  "Ambulatory Consult Sleep" - it will go to our clinic work queue for our Medical Assistant to contact the patient for an appointment.     For any questions, please contact our clinic staff at [phone] to talk to clinical staff        Rima Meade MD    "

## 2024-03-17 ENCOUNTER — PATIENT MESSAGE (OUTPATIENT)
Dept: FAMILY MEDICINE | Facility: CLINIC | Age: 34
End: 2024-03-17
Payer: COMMERCIAL

## 2024-03-17 DIAGNOSIS — G47.33 OBSTRUCTIVE SLEEP APNEA: Primary | ICD-10-CM

## 2024-03-26 ENCOUNTER — OFFICE VISIT (OUTPATIENT)
Dept: SLEEP MEDICINE | Facility: CLINIC | Age: 34
End: 2024-03-26
Attending: PSYCHIATRY & NEUROLOGY
Payer: COMMERCIAL

## 2024-03-26 VITALS
SYSTOLIC BLOOD PRESSURE: 141 MMHG | HEIGHT: 65 IN | HEART RATE: 73 BPM | BODY MASS INDEX: 37.44 KG/M2 | WEIGHT: 224.69 LBS | DIASTOLIC BLOOD PRESSURE: 90 MMHG

## 2024-03-26 DIAGNOSIS — G47.33 OBSTRUCTIVE SLEEP APNEA: ICD-10-CM

## 2024-03-26 PROCEDURE — 3080F DIAST BP >= 90 MM HG: CPT | Mod: CPTII,S$GLB,, | Performed by: PSYCHIATRY & NEUROLOGY

## 2024-03-26 PROCEDURE — 3008F BODY MASS INDEX DOCD: CPT | Mod: CPTII,S$GLB,, | Performed by: PSYCHIATRY & NEUROLOGY

## 2024-03-26 PROCEDURE — 3077F SYST BP >= 140 MM HG: CPT | Mod: CPTII,S$GLB,, | Performed by: PSYCHIATRY & NEUROLOGY

## 2024-03-26 PROCEDURE — 99204 OFFICE O/P NEW MOD 45 MIN: CPT | Mod: S$GLB,,, | Performed by: PSYCHIATRY & NEUROLOGY

## 2024-03-26 PROCEDURE — 99999 PR PBB SHADOW E&M-EST. PATIENT-LVL III: CPT | Mod: PBBFAC,,, | Performed by: PSYCHIATRY & NEUROLOGY

## 2024-03-26 NOTE — PROGRESS NOTES
Cameron Gaona is a 33 y.o. male is here to be evaluated for a sleep disorder; referred by Matthias Richard MD.    The patient reports snoring, excessive daytime sleepiness, and excessive daytime fatigue since several years ago.   Cameron Gaona denied  gasping for air and choking .    The patient does not feel rested upon awakening. Takes occasional naps.     The patient  reports morning headaches and reports  dry mouth on awakening.   Cameron Gaona denies  nasal congestion.      Cameron Gaona  denies symptoms concerning for parasomnia except for occasional somniloquy.  The patient  denies auxiliary symptoms of narcolepsy including sleep onset paralysis, hypnagogic hallucinations, sleep attacks and cataplexy.    Cameron Gaona denied symptoms concerning for RLS; nocturnal leg movements have not been noticed.   The patient does not experience sleep related leg cramps.           Medications pertinent to sleep  disorders taken currently: -  Previous  medications taken  for sleep disorders:  -    Sleep studies  HST 2/24: AHI 40; NORMAN 2 min 58; 50% of time oxygen was below 90%.            3/26/2024     1:32 PM   EPWORTH SLEEPINESS SCALE TOTAL SCORE    Total score 10             3/26/2024     1:32 PM   EPWORTH SLEEPINESS SCALE   Sitting and reading 2   Watching TV 3   Sitting, inactive in a public place (e.g. a theatre or a meeting) 2   As a passenger in a car for an hour without a break 0   Lying down to rest in the afternoon when circumstances permit 2   Sitting and talking to someone 0   Sitting quietly after a lunch without alcohol 1   In a car, while stopped for a few minutes in traffic 0   Total score 10               3/26/2024     1:31 PM   Sleep Clinic New Patient   What time do you go to bed on a week day? (Give a range) 11-12am   What time do you go to bed on a day off? (Give a range) 11-12pm   How long does it take you to fall asleep? (Give a range) 1 hr   On average, how many times  per night do you wake up? 2-3   How long does it take you to fall back into sleep? (Give a range) it varies   What time do you wake up to start your day on a week day? (Give a range) 4am   What time do you wake up to start your day on a day off? (Give a range) 4am   What time do you get out of bed? (Give a range) 4am   On average, how many hours do you sleep? 3-4hrs   On average, how many naps do you take per day? 0   Rate your sleep quality from 0 to 5 (0-poor, 5-great). 2   Have you experienced:  Weight gain?   How much weight have you lost or gained (in lbs.) in the last year? 20lbs   On average, how many times per night do you go to the bathroom?  1-2   Have you ever had a sleep study/CPAP machine/surgery for sleep apnea? Yes   Have you ever had a CPAP machine for sleep apnea? No   Have you ever had surgery for sleep apnea? No           3/26/2024     1:31 PM   Sleep Clinic ROS    Difficulty breathing through the nose?  No   Sore throat or dry mouth in the morning? Sometimes   Irregular or very fast heart beat?  No   Shortness of breath?  Yes   Acid reflux? Yes   Body aches and pains?  Yes   Morning headaches? Yes   Dizziness? No   Mood changes?  Yes   Do you exercise?  No   Do you feel like moving your legs a lot?  Yes       DME:         PAST MEDICAL HISTORY:    Active Ambulatory Problems     Diagnosis Date Noted    Chronic pain of right ankle 03/11/2022    Decreased range of motion of right ankle 03/11/2022    Severe obesity (BMI 35.0-39.9) with comorbidity 01/23/2024    Sleep apnea 03/13/2024    BECKA (obstructive sleep apnea) 03/13/2024     Resolved Ambulatory Problems     Diagnosis Date Noted    No Resolved Ambulatory Problems     No Additional Past Medical History                PAST SURGICAL HISTORY:    No past surgical history on file.      FAMILY HISTORY:                Family History   Problem Relation Age of Onset    No Known Problems Mother     No Known Problems Father     Arthritis Maternal Grandmother  "       SOCIAL HISTORY:          Tobacco:   Social History     Tobacco Use   Smoking Status Former    Current packs/day: 1.00    Average packs/day: 1 pack/day for 3.0 years (3.0 ttl pk-yrs)    Types: Cigarettes    Passive exposure: Past   Smokeless Tobacco Former       alcohol use:    Social History     Substance and Sexual Activity   Alcohol Use Yes    Alcohol/week: 6.0 standard drinks of alcohol    Types: 6 Shots of liquor per week    Comment: I just drink 1 or 2 days on the weekends                   ALLERGIES:    Review of patient's allergies indicates:   Allergen Reactions    Shrimp Swelling     Throat swelling         CURRENT MEDICATIONS:    No current outpatient medications on file.     No current facility-administered medications for this visit.                      REVIEW OF SYSTEMS:   Sleep related symptoms as per HPI      PHYSICAL EXAM:  BP (!) 141/90 (BP Location: Left arm, Patient Position: Sitting, BP Method: Large (Automatic))   Pulse 73   Ht 5' 5" (1.651 m)   Wt 101.9 kg (224 lb 11.2 oz)   BMI 37.39 kg/m²   GENERAL: Muscular build, well groomed.  HEENT:   HEENT:  Conjunctivae are non-erythematous; Pupils equal, round, and reactive to light; Nose is symmetrical; Nasal mucosa is pink and moist; Septum is midline; Inferior turbinates are normal; Nasal airflow is normal; Posterior pharynx is pink; Modified Mallampati:III-IV; Posterior palate is low; Tonsils not visualized; Uvula is elongated;Tongue is enlarged; Dentition is fair; No TMJ tenderness; Jaw opening and protrusion without click and without discomfort.  NECK: Supple. Neck circumference is 18 inches. No thyromegaly. No palpable nodes.     SKIN: On face and neck: No abrasions, no rashes, no lesions.  No subcutaneous nodules are palpable.  RESPIRATORY: Chest is clear to auscultation.  Normal chest expansion and non-labored breathing at rest.  CARDIOVASCULAR: Normal S1, S2.  No murmurs, gallops or rubs. No carotid bruits bilaterally.  No edema. " No clubbing. No cyanosis.    NEURO: Oriented to time, place and person. Normal attention span and concentration. Gait normal.    PSYCH: Affect is full. Mood is normal  MUSCULOSKELETAL: Moves 4 extremities. Gait normal.           ASSESSMENT:    1. Sleep Apnea NEC. The patient symptomatically has  snoring, gasping for air, choking , excessive daytime sleepiness, and excessive daytime fatigue  with exam findings of crowded airway and elevated BMI. The patient has medical co-morbidities of obesity BMI 39  which can be worsened by BECKA. This warrants further investigation for possible obstructive sleep apnea.      2. Insomnia NEC. Multi-factorial -  excess time in bed, poor sleep hygiene, and likely paradoxical insomnia play a role          PLAN:      Treatment: prescription  for auto CPAP 7-20 cm with the mask of a patient's choice was given to the patient.    Education: During our discussion today, we talked about the etiology of obstructive sleep apnea as well as the potential ramifications of untreated sleep apnea, which could include daytime sleepiness, hypertension, heart disease and/or stroke.      We discussed potential treatment options, which could include weight loss, body positioning, continuous positive airway pressure (CPAP), or referral for surgical consideration. The patient preferred CPAP option.     Discussed purpose of PAP therapy, health benefits of CPAP, as well as the potential ramifications of untreated sleep apnea, which could include daytime sleepiness, hypertension, heart disease and/or stroke. An AHI of 15 is associated with increased risk CVD.     Regular replacement of CPAP mask, tubing and filter was recommended.    The patient was given open opportunity to ask questions and/or express concerns about treatment plan. Two point patient identifier confirmed.     Precautions: The patient was advised to abstain from driving should he feel sleepy or drowsy.    Follow up: me after 31 days  of PAP  use.  31-minute visit. >50% spent counseling patient and coordination of care.                        During our discussion today, we talked about the etiology of  BECKA as well as the potential ramifications of untreated sleep apnea, which could include daytime sleepiness, hypertension, heart disease and/or stroke.  We discussed potential treatment options, which could include weight loss, body positioning, continuous positive airway pressure (CPAP), or referral for surgical consideration. Meanwhile, he  is urged to avoid supine sleep, weight gain and alcoholic beverages since all of these can worsen BECKA.     The patient was given open opportunity to ask questions and/or express concerns about treatment plan. Two point patient identifier confirmed.       Precautions: The patient was advised to abstain from driving should he feel sleepy or drowsy.    Follow up: MD 31-90 days of CPAP use    ESS ([place] Sleepiness Scale) and other sleep medicine related questionnaires were reviewed with the patient.      46-minute visit. >50% spent counseling patient and coordination of care.  The patient was  cautioned against drowsy driving.

## 2024-03-26 NOTE — Clinical Note
Jesus Gonzalez,  This is the patient with AHI 40, QOR3gwl 58% on recent HST His PMD ordered his machine 7 days ago - can you possible expedite? Thank you so much !  L

## 2024-03-26 NOTE — PATIENT INSTRUCTIONS
I'm ordering  the machine for you through [facility]:  [phone]->1->2  They will call you within several weeks, or you can call them.  ________________________________    Aftter you get your machine:    1. Please keep in mind, that when you come to  the machine, you will be choosing the CPAP mask during that time.   Please call [phone]->1->2 within 30 days if you uncomfortable with your mask    2. Please let me know through [facility] if you are not comfortable with the pressure settings - I can help.                Sincerely,    Dr. Meade

## 2024-06-24 ENCOUNTER — OFFICE VISIT (OUTPATIENT)
Dept: FAMILY MEDICINE | Facility: CLINIC | Age: 34
End: 2024-06-24
Payer: COMMERCIAL

## 2024-06-24 VITALS
HEART RATE: 83 BPM | OXYGEN SATURATION: 96 % | TEMPERATURE: 98 F | HEIGHT: 65 IN | SYSTOLIC BLOOD PRESSURE: 130 MMHG | WEIGHT: 224.44 LBS | DIASTOLIC BLOOD PRESSURE: 80 MMHG | BODY MASS INDEX: 37.39 KG/M2

## 2024-06-24 DIAGNOSIS — G56.02 LEFT CARPAL TUNNEL SYNDROME: ICD-10-CM

## 2024-06-24 DIAGNOSIS — M77.11 RIGHT LATERAL EPICONDYLITIS: Primary | ICD-10-CM

## 2024-06-24 PROCEDURE — 3075F SYST BP GE 130 - 139MM HG: CPT | Mod: CPTII,S$GLB,, | Performed by: FAMILY MEDICINE

## 2024-06-24 PROCEDURE — 1160F RVW MEDS BY RX/DR IN RCRD: CPT | Mod: CPTII,S$GLB,, | Performed by: FAMILY MEDICINE

## 2024-06-24 PROCEDURE — 99213 OFFICE O/P EST LOW 20 MIN: CPT | Mod: S$GLB,,, | Performed by: FAMILY MEDICINE

## 2024-06-24 PROCEDURE — 1159F MED LIST DOCD IN RCRD: CPT | Mod: CPTII,S$GLB,, | Performed by: FAMILY MEDICINE

## 2024-06-24 PROCEDURE — 3079F DIAST BP 80-89 MM HG: CPT | Mod: CPTII,S$GLB,, | Performed by: FAMILY MEDICINE

## 2024-06-24 PROCEDURE — 3008F BODY MASS INDEX DOCD: CPT | Mod: CPTII,S$GLB,, | Performed by: FAMILY MEDICINE

## 2024-06-25 NOTE — PROGRESS NOTES
" Patient ID: Cameron Gaona is a 33 y.o. male.    Chief Complaint: Arm Pain and Hand Pain    HPI       Cameron Gaona is a 33 y.o. male complains of bilateral arm and hand pain.  His right hand points to the lateral condyle.  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  No complaints of weakness of the hands.  Demonstrates that stressing extensor muscles of her left hand is when the most pain occurs.  Left hand complains of tingling and numbness in his hand primarily thumb through 4th finger.  Happens especially at night and with constant use loss of strength      Review of Symptoms      /80 (BP Location: Right arm, Patient Position: Sitting)   Pulse 83   Temp 98.3 °F (36.8 °C) (Oral)   Ht 5' 5" (1.651 m)   Wt 101.8 kg (224 lb 6.9 oz)   SpO2 96%   BMI 37.35 kg/m²     Physical Exam    Vitals:    06/24/24 1133   BP: 130/80   BP Location: Right arm   Patient Position: Sitting   Pulse: 83   Temp: 98.3 °F (36.8 °C)   TempSrc: Oral   SpO2: 96%   Weight: 101.8 kg (224 lb 6.9 oz)   Height: 5' 5" (1.651 m)       Constitutional:   Oriented to person, place, and time.appears well-developed and well-nourished.   No distress.      HENT  Head: Normocephalic and atraumatic  Right Ear: External ear normal.   Left Ear: External ear normal.   Nose: External nose normal.   Mouth: Moist mucous membranes    Eyes:   Conjunctivae are normal. Right eye exhibits no discharge. Left eye exhibits no discharge. No scleral icterus. No periorbital edema    Musculoskeletal:  No edema. No obvious deformity No wasting   strength bilaterally normal  Bilateral wrist strength within normal limits  Tender to palpation right lateral epicondyle    Not able to induce tingling and numbness with testing for carpal tunnel.     Neurological:  Alert and oriented to person, place, and time. Coordination normal.     Skin:   Skin is warm and dry.  No diaphoresis.   No rash noted. "     Psychiatric: Normal mood and affect. Behavior is normal. Judgment and thought content normal.     Complete Blood Count  Lab Results   Component Value Date    RBC 5.28 01/26/2024    HGB 14.6 01/26/2024    HCT 45.7 01/26/2024    MCV 87 01/26/2024    MCH 27.7 01/26/2024    MCHC 31.9 (L) 01/26/2024    RDW 12.8 01/26/2024     01/26/2024    MPV 9.6 01/26/2024    GRAN 2.8 01/26/2024    GRAN 46.2 01/26/2024    LYMPH 2.4 01/26/2024    LYMPH 39.7 01/26/2024    MONO 0.7 01/26/2024    MONO 11.3 01/26/2024    EOS 0.1 01/26/2024    BASO 0.03 01/26/2024    EOSINOPHIL 2.0 01/26/2024    BASOPHIL 0.5 01/26/2024    DIFFMETHOD Automated 01/26/2024       Comprehensive Metabolic Panel  Lab Results   Component Value Date    GLU 91 01/26/2024    BUN 14 01/26/2024    CREATININE 1.12 01/26/2024     01/26/2024    K 4.1 01/26/2024     01/26/2024    PROT 7.9 01/26/2024    ALBUMIN 4.4 01/26/2024    BILITOT 0.6 01/26/2024    AST 40 01/26/2024    ALKPHOS 93 01/26/2024    CO2 30 (H) 01/26/2024    ALT 51 (H) 01/26/2024    ANIONGAP 7 (L) 01/26/2024       TSH  Lab Results   Component Value Date    TSH 1.180 01/26/2024       Assessment / Plan:      ICD-10-CM ICD-9-CM   1. Right lateral epicondylitis  M77.11 726.32   2. Left carpal tunnel syndrome  G56.02 354.0     Right lateral epicondylitis    Left carpal tunnel syndrome    Strongly consider injection right lateral epicondyle-discussed pros and cons of injection with patient.  Would use 10 milligrams Kenalog and a 0.5 cubic centimeters of lidocaine    Left carpal tunnel syndrome-wrist splints during sleeping times

## 2024-07-24 ENCOUNTER — TELEPHONE (OUTPATIENT)
Dept: FAMILY MEDICINE | Facility: CLINIC | Age: 34
End: 2024-07-24

## 2024-07-24 ENCOUNTER — OFFICE VISIT (OUTPATIENT)
Dept: FAMILY MEDICINE | Facility: CLINIC | Age: 34
End: 2024-07-24
Payer: COMMERCIAL

## 2024-07-24 VITALS
DIASTOLIC BLOOD PRESSURE: 74 MMHG | BODY MASS INDEX: 37.08 KG/M2 | SYSTOLIC BLOOD PRESSURE: 138 MMHG | TEMPERATURE: 99 F | WEIGHT: 222.56 LBS | HEART RATE: 95 BPM | HEIGHT: 65 IN | OXYGEN SATURATION: 96 %

## 2024-07-24 DIAGNOSIS — Z00.00 ROUTINE HEALTH MAINTENANCE: Primary | ICD-10-CM

## 2024-07-24 DIAGNOSIS — M77.11 RIGHT LATERAL EPICONDYLITIS: Primary | ICD-10-CM

## 2024-07-24 DIAGNOSIS — G47.33 OSA (OBSTRUCTIVE SLEEP APNEA): ICD-10-CM

## 2024-07-24 DIAGNOSIS — M77.11 RIGHT LATERAL EPICONDYLITIS: ICD-10-CM

## 2024-07-24 PROCEDURE — 3008F BODY MASS INDEX DOCD: CPT | Mod: CPTII,S$GLB,, | Performed by: FAMILY MEDICINE

## 2024-07-24 PROCEDURE — 3075F SYST BP GE 130 - 139MM HG: CPT | Mod: CPTII,S$GLB,, | Performed by: FAMILY MEDICINE

## 2024-07-24 PROCEDURE — 99395 PREV VISIT EST AGE 18-39: CPT | Mod: S$GLB,,, | Performed by: FAMILY MEDICINE

## 2024-07-24 PROCEDURE — 3078F DIAST BP <80 MM HG: CPT | Mod: CPTII,S$GLB,, | Performed by: FAMILY MEDICINE

## 2024-07-24 PROCEDURE — 1160F RVW MEDS BY RX/DR IN RCRD: CPT | Mod: CPTII,S$GLB,, | Performed by: FAMILY MEDICINE

## 2024-07-24 PROCEDURE — 1159F MED LIST DOCD IN RCRD: CPT | Mod: CPTII,S$GLB,, | Performed by: FAMILY MEDICINE

## 2024-07-24 NOTE — PROGRESS NOTES
" Patient ID: Cameron Gaona is a 33 y.o. male.    Chief Complaint: Follow-up and Arm Pain    HPI      Cameron Gaona is a 33 y.o. male. here for annual exam.   No current complaints except right elbow pain-lateral epicondylitis -previous issue-did not get better with the injection.    Sleep apnea-stable with use of CPAP   Obesity-knows that he needs to lose weight eat better and go to the gym.  Discussed healthy lifestyle        Review of Symptoms    Constitutional: Negative.    HENT: Negative.    Eyes: Negative.    Respiratory: Negative.    Cardiovascular: Negative.    Gastrointestinal: Negative.    Endocrine: Negative.    Genitourinary: Negative.    Musculoskeletal: Negative.    Skin: Negative.    Allergic/Immunologic: Negative.    Neurological: Negative.    Hematological: Negative.    Psychiatric/Behavioral: Negative.      Except as above in HPI      Vitals:    07/24/24 1528   BP: 138/74   BP Location: Left arm   Patient Position: Sitting   Pulse: 95   Temp: 98.8 °F (37.1 °C)   TempSrc: Oral   SpO2: 96%   Weight: 101 kg (222 lb 8.9 oz)   Height: 5' 5" (1.651 m)        Physical  Exam      Constitutional:  Oriented to person, place, and time. Appears well-developed and well-nourished.     HENT:   Head: Normocephalic and atraumatic.     Right Ear: Tympanic membrane, ear canal and External ear normal     Left Ear: Tympanic membrane, ear canal and External ear normal     Nose: Nose normal. No rhinorrhea or nasal deformity.     Mouth/Throat: Uvula is midline, oropharynx is clear and moist and mucous membranes are normal.      Eyes: Conjunctivae are normal. Right eye exhibits no discharge. Left eye exhibits no discharge. No scleral icterus.     Neck:  No JVD present. No tracheal deviation  []  Neck supple.   []  No Carotid bruit    Cardiovascular:  Regular rate and rhythm with normal S1 and S2     Pulmonary/Chest:   Clear to auscultation bilaterally without wheezes, rhonchi or rales    Musculoskeletal: Normal " range of motion. No edema or tenderness.   No deformity     Lymphadenopathy:  No cervical adenopathy.     Neurological:  Alert and oriented to person, place, and time. Coordination normal.     Skin: Skin is warm and dry. No rash noted.     Psychiatric: Normal mood and affect. Speech is normal and behavior is normal. Judgment and thought content normal.     Complete Blood Count  Lab Results   Component Value Date    RBC 5.28 01/26/2024    HGB 14.6 01/26/2024    HCT 45.7 01/26/2024    MCV 87 01/26/2024    MCH 27.7 01/26/2024    MCHC 31.9 (L) 01/26/2024    RDW 12.8 01/26/2024     01/26/2024    MPV 9.6 01/26/2024    GRAN 2.8 01/26/2024    GRAN 46.2 01/26/2024    LYMPH 2.4 01/26/2024    LYMPH 39.7 01/26/2024    MONO 0.7 01/26/2024    MONO 11.3 01/26/2024    EOS 0.1 01/26/2024    BASO 0.03 01/26/2024    EOSINOPHIL 2.0 01/26/2024    BASOPHIL 0.5 01/26/2024    DIFFMETHOD Automated 01/26/2024       Comprehensive Metabolic Panel  Lab Results   Component Value Date    GLU 91 01/26/2024    BUN 14 01/26/2024    CREATININE 1.12 01/26/2024     01/26/2024    K 4.1 01/26/2024     01/26/2024    PROT 7.9 01/26/2024    ALBUMIN 4.4 01/26/2024    BILITOT 0.6 01/26/2024    AST 40 01/26/2024    ALKPHOS 93 01/26/2024    CO2 30 (H) 01/26/2024    ALT 51 (H) 01/26/2024    ANIONGAP 7 (L) 01/26/2024       TSH  Lab Results   Component Value Date    TSH 1.180 01/26/2024       Assessment / Plan:      ICD-10-CM ICD-9-CM   1. Routine health maintenance  Z00.00 V70.0     Routine health maintenance       Continued use CPAP   Consider seeing ortho    Discussed how to stay healthy including: diet, and exercise.

## 2024-07-31 ENCOUNTER — E-VISIT (OUTPATIENT)
Dept: FAMILY MEDICINE | Facility: CLINIC | Age: 34
End: 2024-07-31
Payer: COMMERCIAL

## 2024-07-31 DIAGNOSIS — M77.11 RIGHT LATERAL EPICONDYLITIS: Primary | ICD-10-CM

## 2024-07-31 DIAGNOSIS — G56.02 LEFT CARPAL TUNNEL SYNDROME: ICD-10-CM

## 2024-07-31 PROCEDURE — 99499 UNLISTED E&M SERVICE: CPT | Mod: 95,,, | Performed by: FAMILY MEDICINE

## 2024-08-01 NOTE — PROGRESS NOTES
Please fax the referral printed for him    [facility] was sending me the name to which he would like the referral

## 2024-11-06 ENCOUNTER — PATIENT MESSAGE (OUTPATIENT)
Dept: SLEEP MEDICINE | Facility: CLINIC | Age: 34
End: 2024-11-06

## 2024-11-06 ENCOUNTER — OFFICE VISIT (OUTPATIENT)
Dept: SLEEP MEDICINE | Facility: CLINIC | Age: 34
End: 2024-11-06
Attending: PSYCHIATRY & NEUROLOGY
Payer: COMMERCIAL

## 2024-11-06 VITALS
HEIGHT: 65 IN | DIASTOLIC BLOOD PRESSURE: 80 MMHG | HEART RATE: 86 BPM | BODY MASS INDEX: 37.24 KG/M2 | WEIGHT: 223.5 LBS | SYSTOLIC BLOOD PRESSURE: 143 MMHG

## 2024-11-06 DIAGNOSIS — G47.00 INSOMNIA, UNSPECIFIED TYPE: Primary | ICD-10-CM

## 2024-11-06 DIAGNOSIS — G47.33 OSA (OBSTRUCTIVE SLEEP APNEA): ICD-10-CM

## 2024-11-06 PROCEDURE — 3008F BODY MASS INDEX DOCD: CPT | Mod: CPTII,S$GLB,, | Performed by: PSYCHIATRY & NEUROLOGY

## 2024-11-06 PROCEDURE — 3077F SYST BP >= 140 MM HG: CPT | Mod: CPTII,S$GLB,, | Performed by: PSYCHIATRY & NEUROLOGY

## 2024-11-06 PROCEDURE — 99999 PR PBB SHADOW E&M-EST. PATIENT-LVL III: CPT | Mod: PBBFAC,,, | Performed by: PSYCHIATRY & NEUROLOGY

## 2024-11-06 PROCEDURE — 3079F DIAST BP 80-89 MM HG: CPT | Mod: CPTII,S$GLB,, | Performed by: PSYCHIATRY & NEUROLOGY

## 2024-11-06 PROCEDURE — 99214 OFFICE O/P EST MOD 30 MIN: CPT | Mod: S$GLB,,, | Performed by: PSYCHIATRY & NEUROLOGY

## 2024-11-06 RX ORDER — ZALEPLON 5 MG/1
CAPSULE ORAL
Qty: 30 CAPSULE | Refills: 5 | Status: SHIPPED | OUTPATIENT
Start: 2024-11-06

## 2024-11-06 NOTE — PATIENT INSTRUCTIONS
SLEEP LAB (Stephanie or P[facility]rez) will contact you to schedulethe sleep study. Their number is [phone] (ext [phone]). Please call them if you do not hear from them in 2 weeks from now.  The [facility] is located on 7th floor of the [facility]; [facility] is located in [facility] ( 3rd floor [facility]).    SLEEP CLINIC (my assistant) will call you when the sleep study results are ready or I will message you through the portal with the results as we have discussed - if you have not heard from us by 2 weeks from the date of the study, or you can use [facility] to contact me.    Our clinic phone number is [phone] (ext 1)

## 2024-11-06 NOTE — PROGRESS NOTES
2024    11:27 AM 3/26/2024     1:32 PM   EPWORTH SLEEPINESS SCALE TOTAL SCORE    Total score 8  10       Patient-reported       Cameron Gaona is a 33 y.o. male seen today for CPAP follow up. Last seen on 3/26/2024.    The patient reports improved sleep continuity and daytime sleepiness on PAP. ESS today is .  Denies break through snoring.  No  dry mouth.  No aerophagia or air hunger. Denies occasional mask leaks and discomfort. Using a Dreamwear 1 mask mask       DME: LEAH  got machine in 2024      Sleep studies:     HST : Cameron Gaona 10/07/2024 - 2024 Patient ID: [id number] : 1990 Age: 33 years Gender: Male [facility] [address] [place], [address] Compliance Report Compliance Payor Standard Usage 10/07/2024 - 2024 Usage days 30/30 days (100%) >= 4 hours 24 days (80%) < 4 hours 6 days (20%) Usage hours 147 hours 53 minutes Average usage (total days) 4 hours 56 minutes Average usage (days used) 4 hours 56 minutes Median usage (days used) 4 hours 37 minutes Total used hours (value since last reset - 2024) 797 hours AirSense 11 AutoSet Serial number [id number] Mode AutoSet Min Pressure 4 cmH2O Max Pressure 20 cmH2O EPR Fulltime EPR level 3 Response Standard Therapy Pressure - cmH2O Median: 10.3 95th percentile: 13.1 Maximum: 14.4 Leaks - L/min Median: 0.1 95th percentile: 3.4 Maximum: 19.1 Events per hour AI: 0.8 HI: 0.4 AHI: 1.2 Apnea Index Central: 0.0 Obstructive: 0.7 Unknown: 0.1 RERA Index 0.1 Cheyne-Solis respiration (average duration per night) 0 minutes (0%    INTERVAL HISTORY:    3/26/2024:       Cameron Gaona is a 33 y.o. male is here to be evaluated for a sleep disorder; referred by No ref. provider found.    The patient reports snoring, excessive daytime sleepiness, and excessive daytime fatigue since several years ago.   Cameron Gaona denied  gasping for air and choking .    The patient does not feel rested upon  awakening. Takes occasional naps.     The patient  reports morning headaches and reports  dry mouth on awakening.   Cameron Gaona denies  nasal congestion.      Cameron Gaona  denies symptoms concerning for parasomnia except for occasional somniloquy.  The patient  denies auxiliary symptoms of narcolepsy including sleep onset paralysis, hypnagogic hallucinations, sleep attacks and cataplexy.    Cameron Gaona denied symptoms concerning for RLS; nocturnal leg movements have not been noticed.   The patient does not experience sleep related leg cramps.           Medications pertinent to sleep  disorders taken currently: -  Previous  medications taken  for sleep disorders:  -    Sleep studies  HST 2/24: AHI 40; NORMAN 2 min 58; 50% of time oxygen was below 90%.            11/6/2024    11:27 AM 3/26/2024     1:32 PM   EPWORTH SLEEPINESS SCALE TOTAL SCORE    Total score 8  10       Patient-reported             3/26/2024     1:32 PM   EPWORTH SLEEPINESS SCALE   Sitting and reading 2   Watching TV 3   Sitting, inactive in a public place (e.g. a theatre or a meeting) 2   As a passenger in a car for an hour without a break 0   Lying down to rest in the afternoon when circumstances permit 2   Sitting and talking to someone 0   Sitting quietly after a lunch without alcohol 1   In a car, while stopped for a few minutes in traffic 0   Total score 10               11/06/2024     Sleep Clinic New Patient   What time do you go to bed on a week day? (Give a range) 11-12am   What time do you go to bed on a day off? (Give a range) 11-12pm   How long does it take you to fall asleep? (Give a range) Faster (before CPAP was hr)    On average, how many times per night do you wake up? 1-2 (was 2-3 before CPAP)   How long does it take you to fall back into sleep? (Give a range) fast   What time do you wake up to start your day on a week day? (Give a range) 4am for work 5-7 days a week   What time do you wake up to start your day on a  day off? (Give a range) 5-6 am   What time do you get out of bed? (Give a range) 4am   On average, how many hours do you sleep? 3-4hrs   On average, how many naps do you take per day? 0   Rate your sleep quality from 0 to 5 (0-poor, 5-great). 2   Have you experienced:  Weight gain?   How much weight have you lost or gained (in lbs.) in the last year? 20lbs   On average, how many times per night do you go to the bathroom?  1-2   Have you ever had a sleep study/CPAP machine/surgery for sleep apnea? Yes   Have you ever had a CPAP machine for sleep apnea? No   Have you ever had surgery for sleep apnea? No           3/26/2024     1:31 PM   Sleep Clinic ROS    Difficulty breathing through the nose?  No   Sore throat or dry mouth in the morning? Sometimes   Irregular or very fast heart beat?  No   Shortness of breath?  Yes   Acid reflux? Yes   Body aches and pains?  Yes   Morning headaches? Yes   Dizziness? No   Mood changes?  Yes   Do you exercise?  No   Do you feel like moving your legs a lot?  Yes       DME:         PAST MEDICAL HISTORY:    Active Ambulatory Problems     Diagnosis Date Noted    Chronic pain of right ankle 03/11/2022    Decreased range of motion of right ankle 03/11/2022    Severe obesity (BMI 35.0-39.9) with comorbidity 01/23/2024    Sleep apnea 03/13/2024    BECKA (obstructive sleep apnea) 03/13/2024     Resolved Ambulatory Problems     Diagnosis Date Noted    No Resolved Ambulatory Problems     No Additional Past Medical History                PAST SURGICAL HISTORY:    No past surgical history on file.      FAMILY HISTORY:                Family History   Problem Relation Name Age of Onset    No Known Problems Mother      No Known Problems Father      Arthritis Maternal Grandmother Esther Julianne        SOCIAL HISTORY:          Tobacco:   Social History     Tobacco Use   Smoking Status Former    Current packs/day: 1.00    Average packs/day: 1 pack/day for 3.0 years (3.0 ttl pk-yrs)    Types: Cigarettes     "Passive exposure: Past   Smokeless Tobacco Former       alcohol use:    Social History     Substance and Sexual Activity   Alcohol Use Yes    Alcohol/week: 6.0 standard drinks of alcohol    Types: 6 Shots of liquor per week    Comment: I just drink 1 or 2 days on the weekends                   ALLERGIES:    Review of patient's allergies indicates:   Allergen Reactions    Shrimp Swelling     Throat swelling         CURRENT MEDICATIONS:    No current outpatient medications on file.     No current facility-administered medications for this visit.                      REVIEW OF SYSTEMS:   Sleep related symptoms as per HPI      PHYSICAL EXAM:  BP (!) 143/80 (BP Location: Left arm, Patient Position: Sitting)   Pulse 86   Ht 5' 5" (1.651 m)   Wt 101.4 kg (223 lb 8 oz)   BMI 37.19 kg/m²   GENERAL: Muscular build, well groomed.  HEENT:   HEENT:  Conjunctivae are non-erythematous; Pupils equal, round, and reactive to light; Nose is symmetrical; Nasal mucosa is pink and moist; Septum is midline; Inferior turbinates are normal; Nasal airflow is normal; Posterior pharynx is pink; Modified Mallampati:III-IV; Posterior palate is low; Tonsils not visualized; Uvula is elongated;Tongue is enlarged; Dentition is fair; No TMJ tenderness; Jaw opening and protrusion without click and without discomfort.  NECK: Supple. Neck circumference is 18 inches. No thyromegaly. No palpable nodes.     SKIN: On face and neck: No abrasions, no rashes, no lesions.  No subcutaneous nodules are palpable.  RESPIRATORY: Chest is clear to auscultation.  Normal chest expansion and non-labored breathing at rest.  CARDIOVASCULAR: Normal S1, S2.  No murmurs, gallops or rubs. No carotid bruits bilaterally.  No edema. No clubbing. No cyanosis.    NEURO: Oriented to time, place and person. Normal attention span and concentration. Gait normal.    PSYCH: Affect is full. Mood is normal  MUSCULOSKELETAL: Moves 4 extremities. Gait normal.           ASSESSMENT:    1. " Sleep Apnea NEC. The patient symptomatically has  snoring, gasping for air, choking , excessive daytime sleepiness, and excessive daytime fatigue  with exam findings of crowded airway and elevated BMI. The patient has medical co-morbidities of obesity BMI 39  which can be worsened by BECKA. This warrants further investigation for possible obstructive sleep apnea.      2. Insomnia NEC. Multi-factorial -  excess time in bed, poor sleep hygiene, and likely paradoxical insomnia play a role    3. Residual sleepiness     4. 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    Education: During our discussion today, we talked about the etiology of obstructive sleep apnea as well as the potential ramifications of untreated sleep apnea, which could include daytime sleepiness, hypertension, heart disease and/or stroke.      We discussed potential treatment options, which could include weight loss, body positioning, continuous positive airway pressure (CPAP), or referral for surgical consideration. The patient preferred CPAP option.     Discussed purpose of PAP therapy, health benefits of CPAP, as well as the potential ramifications of untreated sleep apnea, which could include daytime sleepiness, hypertension, heart disease and/or stroke. An AHI of 15 is associated with increased risk CVD.     Regular replacement of CPAP mask, tubing and filter was recommended.    The patient was given open opportunity to ask questions and/or express concerns about treatment plan. Two point patient identifier confirmed.     Precautions: The patient was advised to abstain from driving should he feel sleepy or drowsy.    Follow up: me after 31 days  of PAP use.  31-minute visit. >50% spent  counseling patient and coordination of care.                        During our discussion today, we talked about the etiology of  BECKA as well as the potential ramifications of untreated sleep apnea, which could include daytime sleepiness, hypertension, heart disease and/or stroke.  We discussed potential treatment options, which could include weight loss, body positioning, continuous positive airway pressure (CPAP), or referral for surgical consideration. Meanwhile, he  is urged to avoid supine sleep, weight gain and alcoholic beverages since all of these can worsen BECKA.     The patient was given open opportunity to ask questions and/or express concerns about treatment plan. Two point patient identifier confirmed.       Precautions: The patient was advised to abstain from driving should he feel sleepy or drowsy.    Follow up: MD 31-90 days of CPAP use    ESS (Issue Sleepiness Scale) and other sleep medicine related questionnaires were reviewed with the patient.      46-minute visit. >50% spent counseling patient and coordination of care.  The patient was  cautioned against drowsy driving.

## 2025-04-07 ENCOUNTER — PATIENT MESSAGE (OUTPATIENT)
Dept: FAMILY MEDICINE | Facility: CLINIC | Age: 35
End: 2025-04-07
Payer: COMMERCIAL

## 2025-04-07 DIAGNOSIS — G56.02 LEFT CARPAL TUNNEL SYNDROME: Primary | ICD-10-CM

## 2025-04-21 ENCOUNTER — OFFICE VISIT (OUTPATIENT)
Dept: ORTHOPEDICS | Facility: CLINIC | Age: 35
End: 2025-04-21
Payer: COMMERCIAL

## 2025-04-21 VITALS — HEIGHT: 65 IN | BODY MASS INDEX: 37.19 KG/M2

## 2025-04-21 DIAGNOSIS — M77.8 TENDONITIS OF BOTH WRISTS: Primary | ICD-10-CM

## 2025-04-21 DIAGNOSIS — G56.02 LEFT CARPAL TUNNEL SYNDROME: ICD-10-CM

## 2025-04-21 PROCEDURE — 99203 OFFICE O/P NEW LOW 30 MIN: CPT | Mod: 25,S$GLB,, | Performed by: ORTHOPAEDIC SURGERY

## 2025-04-21 PROCEDURE — 3008F BODY MASS INDEX DOCD: CPT | Mod: CPTII,S$GLB,, | Performed by: ORTHOPAEDIC SURGERY

## 2025-04-21 PROCEDURE — 99999 PR PBB SHADOW E&M-EST. PATIENT-LVL III: CPT | Mod: PBBFAC,,, | Performed by: ORTHOPAEDIC SURGERY

## 2025-04-21 PROCEDURE — 20526 THER INJECTION CARP TUNNEL: CPT | Mod: 50,S$GLB,, | Performed by: ORTHOPAEDIC SURGERY

## 2025-04-21 PROCEDURE — 1159F MED LIST DOCD IN RCRD: CPT | Mod: CPTII,S$GLB,, | Performed by: ORTHOPAEDIC SURGERY

## 2025-04-21 RX ORDER — SULINDAC 200 MG/1
200 TABLET ORAL 2 TIMES DAILY WITH MEALS
Qty: 60 TABLET | Refills: 1 | Status: SHIPPED | OUTPATIENT
Start: 2025-04-21

## 2025-04-21 RX ORDER — TRIAMCINOLONE ACETONIDE 40 MG/ML
40 INJECTION, SUSPENSION INTRA-ARTICULAR; INTRAMUSCULAR
Status: COMPLETED | OUTPATIENT
Start: 2025-04-21 | End: 2025-04-21

## 2025-04-21 RX ADMIN — TRIAMCINOLONE ACETONIDE 40 MG: 40 INJECTION, SUSPENSION INTRA-ARTICULAR; INTRAMUSCULAR at 01:04

## 2025-04-21 NOTE — PROGRESS NOTES
"Subjective:      Patient ID: Cameron Gaona is a 34 y.o. male.    Chief Complaint: Consult (Bilateral hand numbness, tingling and pain)      HPI  Cameron Gaona is a  34 y.o. male presenting today for bilateral hand symptoms including stiffness and swelling.  There was not a history of trauma.  Onset of symptoms began more than 3 months ago   By his report he had a nerve test done elsewhere which was negative for carpal tunnel syndrome   He has not really had any other specific treatment for this.      Review of patient's allergies indicates:   Allergen Reactions    Shrimp Swelling     Throat swelling           Current Medications[1]    No past medical history on file.    No past surgical history on file.    Review of Systems:  ROS    OBJECTIVE:     PHYSICAL EXAM:  Height: 5' 5" (165.1 cm)    Vitals:    04/21/25 1308   Height: 5' 5" (1.651 m)   PainSc:   8   PainLoc: Hand     Well developed, well nourished male in no acute distress  Alert and oriented x 3  HEENT- Normal exam  Lungs- Clear to auscultation  Heart- Regular rate and rhythm  Abdomen- Soft nontender  Extremity exam- examination of the hands show some mild swelling   Tinel sign negative Phalen's test negative   Range of motion fingers full but  strength slightly decreased mild tenderness in the palm   Sensation intact no atrophy    RADIOGRAPHS:  None  Comments: I have personally reviewed the imaging and I agree with the above radiologist's report.    ASSESSMENT/PLAN:     IMPRESSION:  Bilateral wrist tendinitis possible early carpal tunnel syndrome    PLAN:  I believe his symptoms are mostly related to tendinitis but he may have some symptoms of early carpal tunnel not showing on the nerve test   For treatment I have given him wrist braces for nighttime use and started him on Clinoril twice a day with food   Also recommended injections both wrist   After pause for time-out identified each wrist injected carpal tunnel bilateral with combination " Kenalog 20 mg 0.5 cc xylocaine sterile technique   Tolerated the procedure well without complication  Follow-up 4-6 weeks       - We talked at length about the anatomy and pathophysiology of   Encounter Diagnoses   Name Primary?    Left carpal tunnel syndrome     Tendonitis of both wrists Yes           Disclaimer: This note has been generated using voice-recognition software. There may be typographical errors that have been missed during proof-reading.          [1]   Current Outpatient Medications   Medication Sig Dispense Refill    sulindac (CLINORIL) 200 MG Tab Take 1 tablet (200 mg total) by mouth 2 (two) times daily with meals. 60 tablet 1    zaleplon (SONATA) 5 MG Cap TAKE 1 CAPSULE BY MOUTH nightly as needed for insomnia. If 1 pill is not effective for insomnia, increase to 2 pills. (Patient not taking: Reported on 4/21/2025) 30 capsule 5     No current facility-administered medications for this visit.

## 2025-06-17 RX ORDER — SULINDAC 200 MG/1
200 TABLET ORAL 2 TIMES DAILY WITH MEALS
Qty: 60 TABLET | Refills: 1 | Status: SHIPPED | OUTPATIENT
Start: 2025-06-17

## 2025-07-22 ENCOUNTER — LAB VISIT (OUTPATIENT)
Dept: LAB | Facility: HOSPITAL | Age: 35
End: 2025-07-22
Attending: FAMILY MEDICINE
Payer: COMMERCIAL

## 2025-07-22 DIAGNOSIS — Z00.00 ROUTINE HEALTH MAINTENANCE: ICD-10-CM

## 2025-07-22 LAB
ABSOLUTE EOSINOPHIL (OHS): 0.2 K/UL
ABSOLUTE MONOCYTE (OHS): 0.55 K/UL (ref 0.3–1)
ABSOLUTE NEUTROPHIL COUNT (OHS): 2.8 K/UL (ref 1.8–7.7)
ALBUMIN SERPL BCP-MCNC: 4.2 G/DL (ref 3.5–5.2)
ALP SERPL-CCNC: 106 UNIT/L (ref 38–126)
ALT SERPL W/O P-5'-P-CCNC: 48 UNIT/L (ref 10–44)
ANION GAP (OHS): 7 MMOL/L (ref 8–16)
AST SERPL-CCNC: 34 UNIT/L (ref 15–46)
BASOPHILS # BLD AUTO: 0.02 K/UL
BASOPHILS NFR BLD AUTO: 0.3 %
BILIRUB SERPL-MCNC: 0.7 MG/DL (ref 0.1–1)
BUN SERPL-MCNC: 12 MG/DL (ref 2–20)
CALCIUM SERPL-MCNC: 8.8 MG/DL (ref 8.7–10.5)
CHLORIDE SERPL-SCNC: 108 MMOL/L (ref 95–110)
CHOLEST SERPL-MCNC: 172 MG/DL (ref 120–199)
CHOLEST/HDLC SERPL: 4.2 {RATIO} (ref 2–5)
CO2 SERPL-SCNC: 27 MMOL/L (ref 23–29)
CREAT SERPL-MCNC: 1 MG/DL (ref 0.5–1.4)
ERYTHROCYTE [DISTWIDTH] IN BLOOD BY AUTOMATED COUNT: 13.4 % (ref 11.5–14.5)
GFR SERPLBLD CREATININE-BSD FMLA CKD-EPI: >60 ML/MIN/1.73/M2
GLUCOSE SERPL-MCNC: 100 MG/DL (ref 70–110)
HCT VFR BLD AUTO: 44.6 % (ref 40–54)
HDLC SERPL-MCNC: 41 MG/DL (ref 40–75)
HDLC SERPL: 23.8 % (ref 20–50)
HGB BLD-MCNC: 14.5 GM/DL (ref 14–18)
IMM GRANULOCYTES # BLD AUTO: 0.02 K/UL (ref 0–0.04)
IMM GRANULOCYTES NFR BLD AUTO: 0.3 % (ref 0–0.5)
LDLC SERPL CALC-MCNC: 110.8 MG/DL (ref 63–159)
LYMPHOCYTES # BLD AUTO: 2.71 K/UL (ref 1–4.8)
MCH RBC QN AUTO: 27.9 PG (ref 27–31)
MCHC RBC AUTO-ENTMCNC: 32.5 G/DL (ref 32–36)
MCV RBC AUTO: 86 FL (ref 82–98)
NONHDLC SERPL-MCNC: 131 MG/DL
NUCLEATED RBC (/100WBC) (OHS): 0 /100 WBC
PLATELET # BLD AUTO: 367 K/UL (ref 150–450)
PMV BLD AUTO: 9.3 FL (ref 9.2–12.9)
POTASSIUM SERPL-SCNC: 4.5 MMOL/L (ref 3.5–5.1)
PROT SERPL-MCNC: 7.6 GM/DL (ref 6–8.4)
RBC # BLD AUTO: 5.2 M/UL (ref 4.6–6.2)
RELATIVE EOSINOPHIL (OHS): 3.2 %
RELATIVE LYMPHOCYTE (OHS): 43 % (ref 18–48)
RELATIVE MONOCYTE (OHS): 8.7 % (ref 4–15)
RELATIVE NEUTROPHIL (OHS): 44.5 % (ref 38–73)
SODIUM SERPL-SCNC: 142 MMOL/L (ref 136–145)
TRIGL SERPL-MCNC: 101 MG/DL (ref 30–150)
TSH SERPL-ACNC: 1.91 UIU/ML (ref 0.4–4)
WBC # BLD AUTO: 6.3 K/UL (ref 3.9–12.7)

## 2025-07-22 PROCEDURE — 85025 COMPLETE CBC W/AUTO DIFF WBC: CPT | Mod: PN

## 2025-07-22 PROCEDURE — 80061 LIPID PANEL: CPT | Mod: PN

## 2025-07-22 PROCEDURE — 36415 COLL VENOUS BLD VENIPUNCTURE: CPT | Mod: PN

## 2025-07-22 PROCEDURE — 84443 ASSAY THYROID STIM HORMONE: CPT | Mod: PN

## 2025-07-22 PROCEDURE — 84075 ASSAY ALKALINE PHOSPHATASE: CPT | Mod: PN

## 2025-07-29 ENCOUNTER — OFFICE VISIT (OUTPATIENT)
Dept: FAMILY MEDICINE | Facility: CLINIC | Age: 35
End: 2025-07-29
Payer: COMMERCIAL

## 2025-07-29 VITALS
HEART RATE: 101 BPM | BODY MASS INDEX: 36.18 KG/M2 | HEIGHT: 65 IN | DIASTOLIC BLOOD PRESSURE: 62 MMHG | OXYGEN SATURATION: 97 % | TEMPERATURE: 98 F | SYSTOLIC BLOOD PRESSURE: 114 MMHG | WEIGHT: 217.13 LBS

## 2025-07-29 DIAGNOSIS — G47.33 OSA (OBSTRUCTIVE SLEEP APNEA): ICD-10-CM

## 2025-07-29 DIAGNOSIS — Z00.00 ROUTINE HEALTH MAINTENANCE: Primary | ICD-10-CM

## 2025-07-29 DIAGNOSIS — E66.01 SEVERE OBESITY (BMI 35.0-39.9) WITH COMORBIDITY: ICD-10-CM

## 2025-07-29 PROCEDURE — 99395 PREV VISIT EST AGE 18-39: CPT | Mod: S$GLB,,, | Performed by: FAMILY MEDICINE

## 2025-07-29 PROCEDURE — 3074F SYST BP LT 130 MM HG: CPT | Mod: CPTII,S$GLB,, | Performed by: FAMILY MEDICINE

## 2025-07-29 PROCEDURE — 3078F DIAST BP <80 MM HG: CPT | Mod: CPTII,S$GLB,, | Performed by: FAMILY MEDICINE

## 2025-07-29 PROCEDURE — 3008F BODY MASS INDEX DOCD: CPT | Mod: CPTII,S$GLB,, | Performed by: FAMILY MEDICINE

## 2025-07-29 PROCEDURE — 1160F RVW MEDS BY RX/DR IN RCRD: CPT | Mod: CPTII,S$GLB,, | Performed by: FAMILY MEDICINE

## 2025-07-29 PROCEDURE — 1159F MED LIST DOCD IN RCRD: CPT | Mod: CPTII,S$GLB,, | Performed by: FAMILY MEDICINE

## 2025-08-09 NOTE — PROGRESS NOTES
" Patient ID: Cameron Gaona is a 34 y.o. male.    Chief Complaint: Annual Exam    HPI      Cameron Gaona is a 34 y.o. male. here for annual exam.   No current complaints.    History of Present Illness    CHIEF COMPLAINT:  Patient presents today for follow up.    SKIN CONCERNS:  He reports persistent daily back itching for the past year with intermittent flare-ups causing significant discomfort. Multiple interventions including changing soaps, detergents, lotions, and OTC antihistamines (Zyrtec, Claritin) have been ineffective. He expresses interest in allergy testing.    SLEEP APNEA:  He reports consistent daily CPAP machine use which has positively impacted his blood pressure, now normalized.    DIET AND LIFESTYLE:  He works in construction performing daily outdoor physical labor. Due to limited time for meal preparation, he eats at restaurants 2-3 times per week. He acknowledges the need for dietary modifications and meal planning.    LABS:  ALT liver enzyme is slightly elevated by 3 points above normal range, though not clinically significant given no other abnormalities. All other labs are normal.         Review of Symptoms    Constitutional: Negative.    HENT: Negative.    Eyes: Negative.    Respiratory: Negative.    Cardiovascular: Negative.    Gastrointestinal: Negative.    Endocrine: Negative.    Genitourinary: Negative.    Musculoskeletal: Negative.    Skin: Negative.    Allergic/Immunologic: Negative.    Neurological: Negative.    Hematological: Negative.    Psychiatric/Behavioral: Negative.      Except as above in HPI      Vitals:    07/29/25 1526   BP: 114/62   BP Location: Left arm   Patient Position: Sitting   Pulse: 101   Temp: 98.1 °F (36.7 °C)   TempSrc: Oral   SpO2: 97%   Weight: 98.5 kg (217 lb 2.5 oz)   Height: 5' 5" (1.651 m)        Physical  Exam      Constitutional:  Oriented to person, place, and time. Appears well-developed and well-nourished.     HENT:   Head: Normocephalic and " atraumatic.     Right Ear: Tympanic membrane, ear canal and External ear normal     Left Ear: Tympanic membrane, ear canal and External ear normal     Nose: Nose normal. No rhinorrhea or nasal deformity.     Mouth/Throat: Uvula is midline, oropharynx is clear and moist and mucous membranes are normal.      Eyes: Conjunctivae are normal. Right eye exhibits no discharge. Left eye exhibits no discharge. No scleral icterus.     Neck:  No JVD present. No tracheal deviation  []  Neck supple.   []  No Carotid bruit    Cardiovascular:  Regular rate and rhythm with normal S1 and S2     Pulmonary/Chest:   Clear to auscultation bilaterally without wheezes, rhonchi or rales    Musculoskeletal: Normal range of motion. No edema or tenderness.   No deformity     Lymphadenopathy:  No cervical adenopathy.     Neurological:  Alert and oriented to person, place, and time. Coordination normal.     Skin: Skin is warm and dry. No rash noted.     Psychiatric: Normal mood and affect. Speech is normal and behavior is normal. Judgment and thought content normal.     Physical Exam              Complete Blood Count  Lab Results   Component Value Date    RBC 5.20 07/22/2025    HGB 14.5 07/22/2025    HCT 44.6 07/22/2025    MCV 86 07/22/2025    MCH 27.9 07/22/2025    MCHC 32.5 07/22/2025    RDW 13.4 07/22/2025     07/22/2025    MPV 9.3 07/22/2025    GRAN 2.8 01/26/2024    GRAN 46.2 01/26/2024    LYMPH 43.0 07/22/2025    LYMPH 2.71 07/22/2025    MONO 8.7 07/22/2025    MONO 0.55 07/22/2025    EOS 3.2 07/22/2025    EOS 0.20 07/22/2025    BASO 0.03 01/26/2024    EOSINOPHIL 2.0 01/26/2024    BASOPHIL 0.3 07/22/2025    BASOPHIL 0.02 07/22/2025    DIFFMETHOD Automated 01/26/2024       Comprehensive Metabolic Panel  Lab Results   Component Value Date     07/22/2025    BUN 12 07/22/2025    CREATININE 1.0 07/22/2025     07/22/2025    K 4.5 07/22/2025     07/22/2025    PROT 7.6 07/22/2025    ALBUMIN 4.2 07/22/2025    BILITOT 0.7  07/22/2025    AST 34 07/22/2025    ALKPHOS 106 07/22/2025    CO2 27 07/22/2025    ALT 48 (H) 07/22/2025    ANIONGAP 7 (L) 07/22/2025       TSH  Lab Results   Component Value Date    TSH 1.910 07/22/2025       Assessment / Plan:      ICD-10-CM ICD-9-CM   1. Routine health maintenance  Z00.00 V70.0   2. Severe obesity (BMI 35.0-39.9) with comorbidity  E66.01 278.01     Routine health maintenance  -     Comprehensive Metabolic Panel; Future  -     CBC Auto Differential; Future  -     Lipid Panel; Future  -     TSH; Future    Severe obesity (BMI 35.0-39.9) with comorbidity        Assessment & Plan    - Reviewed lab work, noting all results were normal except for slightly elevated ALT (3 points above normal), deemed clinically insignificant.  - Considered reports of persistent itching on the back, started cetirizine 10 mg at night for itching symptoms.  - Assessed use of CPAP machine for sleep apnea, noting improved BP as positive outcome of treatment.    SEVERE OBESITY (BMI 35.0-39.9) WITH COMORBIDITY:  - Evaluated lab work showing normal results except for slightly elevated ALT liver enzyme.  - Advised patient on comprehensive weight management strategy including proper nutrition and regular exercise.  - Discussed reducing frequency of eating out, as patient currently consumes high-calorie and high-fat foods frequently.  - Recommend drinking plenty of water to stay hydrated and reduce hunger.  - Suggested considering prepared meals as an alternative to fast food consumption.    OBSTRUCTIVE SLEEP APNEA:  - Confirmed patient uses CPAP machine daily, which has improved blood pressure.  - Emphasized continued CPAP usage is required to meet CDL requirements.    HYPERTENSION:  - Blood pressure evaluated and reported as normal, likely benefiting from consistent CPAP usage.    PRURITUS:  - Patient reports daily itching on back.  - Prescribed cetirizine 10 mg at night for symptom relief.  - Instructed to contact office if  symptoms persist after trialing the antihistamine.  - Will consider allergy testing and dermatology referral if needed.    ELEVATED LIVER ENZYMES:  - Monitored ALT level, which is 3 points above normal.    INAPPROPRIATE DIET AND EATING HABITS:  - Discussed negative health impacts and financial costs of frequent fast food consumption.  - Recommend gradual approach to improving diet: preparing meals at home starting with 1 day weekly and incorporating lighter options like salads.  - Suggested tracking food expenses to compare costs with healthier alternatives.    FOLLOW-UP:  - Scheduled annual follow-up visit in 1 year with lab work.         Discussed how to stay healthy including: diet, and exercise.    This note was generated with the assistance of ambient listening technology. Verbal consent was obtained by the patient and accompanying visitor(s) for the recording of patient appointment to facilitate this note. I attest to having reviewed and edited the generated note for accuracy, though some syntax or spelling errors may persist. Please contact the author of this note for any clarification.